# Patient Record
Sex: MALE | Race: BLACK OR AFRICAN AMERICAN | NOT HISPANIC OR LATINO | Employment: FULL TIME | URBAN - METROPOLITAN AREA
[De-identification: names, ages, dates, MRNs, and addresses within clinical notes are randomized per-mention and may not be internally consistent; named-entity substitution may affect disease eponyms.]

---

## 2023-06-27 PROBLEM — E87.6 HYPOKALEMIA: Status: ACTIVE | Noted: 2023-06-27

## 2023-06-27 PROBLEM — M79.671 RIGHT FOOT PAIN: Status: ACTIVE | Noted: 2023-06-27

## 2023-06-27 PROBLEM — N18.9 CHRONIC RENAL INSUFFICIENCY: Status: ACTIVE | Noted: 2023-06-27

## 2023-06-27 PROBLEM — I67.2 CEREBRAL ATHEROSCLEROSIS: Status: ACTIVE | Noted: 2023-06-27

## 2023-06-27 PROBLEM — E11.9 DIABETES MELLITUS TYPE 2, CONTROLLED (MULTI): Status: ACTIVE | Noted: 2023-06-27

## 2023-06-27 PROBLEM — R04.0 EPISTAXIS: Status: ACTIVE | Noted: 2023-06-27

## 2023-06-27 PROBLEM — K21.9 GE REFLUX: Status: ACTIVE | Noted: 2023-06-27

## 2023-06-27 PROBLEM — R05.3 CHRONIC COUGH: Status: ACTIVE | Noted: 2023-06-27

## 2023-06-27 PROBLEM — R20.0 LEFT FACIAL NUMBNESS: Status: ACTIVE | Noted: 2023-06-27

## 2023-06-27 PROBLEM — E66.813 CLASS 3 SEVERE OBESITY IN ADULT: Status: ACTIVE | Noted: 2023-06-27

## 2023-06-27 PROBLEM — E78.5 DYSLIPIDEMIA: Status: ACTIVE | Noted: 2023-06-27

## 2023-06-27 PROBLEM — I61.9 INTRACEREBRAL HEMORRHAGE (MULTI): Status: ACTIVE | Noted: 2023-06-27

## 2023-06-27 PROBLEM — I10 BENIGN ESSENTIAL HYPERTENSION: Status: ACTIVE | Noted: 2023-06-27

## 2023-06-27 PROBLEM — D64.9 ANEMIA: Status: ACTIVE | Noted: 2023-06-27

## 2023-06-27 PROBLEM — R40.0 DAYTIME SOMNOLENCE: Status: ACTIVE | Noted: 2023-06-27

## 2023-06-27 PROBLEM — E66.01 CLASS 3 SEVERE OBESITY IN ADULT (MULTI): Status: ACTIVE | Noted: 2023-06-27

## 2023-06-27 PROBLEM — N52.9 MALE ERECTILE DISORDER: Status: ACTIVE | Noted: 2023-06-27

## 2023-06-27 PROBLEM — G47.33 OBSTRUCTIVE SLEEP APNEA: Status: ACTIVE | Noted: 2023-06-27

## 2023-06-27 PROBLEM — I50.33 ACUTE ON CHRONIC DIASTOLIC CONGESTIVE HEART FAILURE (MULTI): Status: ACTIVE | Noted: 2023-06-27

## 2023-06-27 PROBLEM — R09.89 THROAT CLEARING: Status: ACTIVE | Noted: 2023-06-27

## 2023-06-27 RX ORDER — AMLODIPINE BESYLATE 5 MG/1
1 TABLET ORAL DAILY
COMMUNITY
Start: 2016-12-13 | End: 2024-05-30 | Stop reason: SDUPTHER

## 2023-06-27 RX ORDER — CARVEDILOL 25 MG/1
1 TABLET ORAL 2 TIMES DAILY
COMMUNITY
Start: 2016-02-20 | End: 2023-09-26 | Stop reason: SDUPTHER

## 2023-06-27 RX ORDER — NAPROXEN SODIUM 220 MG/1
81 TABLET, FILM COATED ORAL DAILY
COMMUNITY
Start: 2021-05-20 | End: 2024-05-30 | Stop reason: WASHOUT

## 2023-06-27 RX ORDER — CLOPIDOGREL BISULFATE 75 MG/1
1 TABLET ORAL DAILY
COMMUNITY
Start: 2021-05-20 | End: 2024-05-30 | Stop reason: WASHOUT

## 2023-06-27 RX ORDER — ATORVASTATIN CALCIUM 80 MG/1
1 TABLET, FILM COATED ORAL DAILY
COMMUNITY
Start: 2021-05-20 | End: 2024-05-30 | Stop reason: WASHOUT

## 2023-06-27 RX ORDER — ACETAMINOPHEN 325 MG/1
325 TABLET ORAL EVERY 6 HOURS
COMMUNITY
Start: 2020-11-19 | End: 2024-05-30 | Stop reason: WASHOUT

## 2023-06-27 RX ORDER — HYDRALAZINE HYDROCHLORIDE 100 MG/1
1 TABLET, FILM COATED ORAL 3 TIMES DAILY
COMMUNITY
Start: 2016-12-13 | End: 2024-01-31 | Stop reason: SDUPTHER

## 2023-06-29 ENCOUNTER — APPOINTMENT (OUTPATIENT)
Dept: PRIMARY CARE | Facility: CLINIC | Age: 53
End: 2023-06-29
Payer: COMMERCIAL

## 2023-06-29 ENCOUNTER — OFFICE VISIT (OUTPATIENT)
Dept: PRIMARY CARE | Facility: CLINIC | Age: 53
End: 2023-06-29
Payer: COMMERCIAL

## 2023-06-29 VITALS — BODY MASS INDEX: 46.38 KG/M2 | WEIGHT: 305 LBS

## 2023-06-29 DIAGNOSIS — Z12.11 COLON CANCER SCREENING: ICD-10-CM

## 2023-06-29 DIAGNOSIS — N18.9 CHRONIC RENAL IMPAIRMENT, UNSPECIFIED CKD STAGE: Primary | ICD-10-CM

## 2023-06-29 DIAGNOSIS — E78.5 DYSLIPIDEMIA: ICD-10-CM

## 2023-06-29 DIAGNOSIS — I10 BENIGN ESSENTIAL HYPERTENSION: ICD-10-CM

## 2023-06-29 DIAGNOSIS — E66.01 CLASS 3 SEVERE OBESITY WITH BODY MASS INDEX (BMI) OF 45.0 TO 49.9 IN ADULT, UNSPECIFIED OBESITY TYPE, UNSPECIFIED WHETHER SERIOUS COMORBIDITY PRESENT (MULTI): ICD-10-CM

## 2023-06-29 DIAGNOSIS — I67.2 CEREBRAL ATHEROSCLEROSIS: ICD-10-CM

## 2023-06-29 PROCEDURE — 99214 OFFICE O/P EST MOD 30 MIN: CPT | Performed by: INTERNAL MEDICINE

## 2023-06-29 PROCEDURE — 4010F ACE/ARB THERAPY RXD/TAKEN: CPT | Performed by: INTERNAL MEDICINE

## 2023-06-29 PROCEDURE — 3008F BODY MASS INDEX DOCD: CPT | Performed by: INTERNAL MEDICINE

## 2023-06-29 RX ORDER — LISINOPRIL 20 MG/1
20 TABLET ORAL DAILY
Qty: 30 TABLET | Refills: 5 | Status: CANCELLED | OUTPATIENT
Start: 2023-06-29 | End: 2023-12-26

## 2023-06-29 RX ORDER — HYDROCHLOROTHIAZIDE 50 MG/1
50 TABLET ORAL DAILY
COMMUNITY
End: 2024-01-31 | Stop reason: WASHOUT

## 2023-06-29 RX ORDER — POTASSIUM CHLORIDE 750 MG/1
1 CAPSULE, EXTENDED RELEASE ORAL DAILY
COMMUNITY
Start: 2021-05-20 | End: 2023-08-03

## 2023-06-29 RX ORDER — OLMESARTAN MEDOXOMIL 40 MG/1
40 TABLET ORAL DAILY
Qty: 30 TABLET | Refills: 5 | Status: SHIPPED | OUTPATIENT
Start: 2023-06-29 | End: 2023-07-20 | Stop reason: SDUPTHER

## 2023-06-29 RX ORDER — LOSARTAN POTASSIUM 50 MG/1
50 TABLET ORAL DAILY
Qty: 60 TABLET | Refills: 0 | Status: SHIPPED | OUTPATIENT
Start: 2023-06-29 | End: 2023-06-29

## 2023-06-29 ASSESSMENT — ENCOUNTER SYMPTOMS
FATIGUE: 0
HEMATURIA: 0
POLYDIPSIA: 0
APPETITE CHANGE: 0
COUGH: 0
FEVER: 0
HEADACHES: 0
WEAKNESS: 0
NUMBNESS: 0
DYSURIA: 0
STRIDOR: 0
WHEEZING: 0
CHILLS: 0
MYALGIAS: 0
CONFUSION: 0
SHORTNESS OF BREATH: 0
TREMORS: 0
ABDOMINAL DISTENTION: 0
PALPITATIONS: 0
DIFFICULTY URINATING: 0

## 2023-06-29 NOTE — LETTER
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    If you have any questions or concerns, please don't hesitate to call.         Sincerely,        Tammy Villatoro,     CC: No Recipients

## 2023-06-29 NOTE — PROGRESS NOTES
Subjective   Patient ID: Alvin VILLANUEVA Dyer is a 52 y.o. male with a PMH of HTN, obesity, suspected CKD, CVA on Plavix, who presents to [facility] clinic for follow up appointment. Today, we discussed his elevated BP in the office and one the last couple of readings. He states that one of the contributing factors could be stress related related to work. Otherwise, denies suicidal ideation or having a feeling to harm himself or others.   Denies all of ROS.     HPI  Past Medical History:   Diagnosis Date    Anemia, unspecified 10/29/2021    Anemia    Chronic kidney disease, unspecified 05/11/2022    Chronic renal insufficiency    Epistaxis     Frequent nosebleeds    Essential (primary) hypertension 08/11/2022    Benign essential hypertension    Nontraumatic intracerebral hemorrhage, unspecified (CMS/HCC) 12/02/2016    Intracerebral hemorrhage    Other forms of dyspnea 02/18/2016    Dyspnea on exertion    Personal history of other diseases of the circulatory system     History of congestive heart disease    Personal history of other endocrine, nutritional and metabolic disease 11/18/2016    History of hyperglycemia    Personal history of transient ischemic attack (TIA), and cerebral infarction without residual deficits     History of cerebrovascular accident     Past Surgical History:   Procedure Laterality Date    APPENDECTOMY  12/02/2016    Appendectomy    MR HEAD ANGIO WO IV CONTRAST  11/19/2020    MR HEAD ANGIO WO IV CONTRAST 11/19/2020 [facility]    MR HEAD ANGIO WO IV CONTRAST  2/23/2021    MR HEAD ANGIO WO IV CONTRAST 2/23/2021 [facility]    MR NECK ANGIO WO IV CONTRAST  11/19/2020    MR NECK ANGIO WO IV CONTRAST 11/19/2020 [facility]    MR NECK ANGIO WO IV CONTRAST  2/23/2021    MR NECK ANGIO WO IV CONTRAST 2/23/2021 [facility]     No family history on file.     Ace inhibitors    Review of Systems   Constitutional:  Negative for appetite change, chills, fatigue and fever.    Eyes:  Negative for visual disturbance.   Respiratory:  Negative for cough, shortness of breath, wheezing and stridor.    Cardiovascular:  Negative for chest pain, palpitations and leg swelling.   Gastrointestinal:  Negative for abdominal distention.   Endocrine: Negative for polydipsia and polyuria.   Genitourinary:  Negative for difficulty urinating, dysuria, enuresis and hematuria.   Musculoskeletal:  Negative for myalgias.   Skin:  Negative for pallor.   Neurological:  Negative for tremors, weakness, numbness and headaches.   Psychiatric/Behavioral:  Negative for confusion and suicidal ideas.    Patient ID: Alvin Dyer is a 52 y.o. male.    Procedures    Objective   Physical Exam  Constitutional:       General: He is not in acute distress.     Appearance: Normal appearance. He is obese.   HENT:      Head: Normocephalic and atraumatic.      Nose: Nose normal.      Mouth/Throat:      Mouth: Mucous membranes are moist.   Eyes:      Conjunctiva/sclera: Conjunctivae normal.   Cardiovascular:      Rate and Rhythm: Normal rate and regular rhythm.      Heart sounds: Normal heart sounds.   Pulmonary:      Breath sounds: Normal breath sounds.   Abdominal:      General: Bowel sounds are normal.      Palpations: Abdomen is soft.   Musculoskeletal:         General: No swelling.      Cervical back: Neck supple.   Skin:     General: Skin is warm and dry.   Neurological:      General: No focal deficit present.      Mental Status: He is alert.       Wt 138 kg (305 lb)   BMI 46.38 kg/m²     Assessment/Plan   Problem List Items Addressed This Visit       Benign essential hypertension    Relevant Medications    olmesartan (BENIcar) 40 mg tablet    Other Relevant Orders    Hemoglobin A1C    Albumin , Urine Random    Comprehensive Metabolic Panel    Vitamin D, Total    Lipid Panel Non-Fasting    CBC    Cerebral atherosclerosis     - on Plavix d/t h/o CVA         Chronic renal insufficiency - Primary    Relevant Orders     Referral to Nephrology    Class 3 severe obesity in adult (CMS/HCC)    Relevant Orders    TSH with reflex to Free T4 if abnormal    Lipid Panel Non-Fasting    CBC    Dyslipidemia    Relevant Orders    Hemoglobin A1C    Albumin , Urine Random    Comprehensive Metabolic Panel    Vitamin D, Total    Lipid Panel Non-Fasting    CBC     Other Visit Diagnoses       Colon cancer screening        Relevant Orders    Cologuard® colon cancer screening

## 2023-07-20 ENCOUNTER — OFFICE VISIT (OUTPATIENT)
Dept: PRIMARY CARE | Facility: CLINIC | Age: 53
End: 2023-07-20
Payer: COMMERCIAL

## 2023-07-20 VITALS — WEIGHT: 299 LBS | SYSTOLIC BLOOD PRESSURE: 210 MMHG | DIASTOLIC BLOOD PRESSURE: 125 MMHG | BODY MASS INDEX: 45.46 KG/M2

## 2023-07-20 DIAGNOSIS — I10 BENIGN ESSENTIAL HYPERTENSION: ICD-10-CM

## 2023-07-20 DIAGNOSIS — N18.9 CHRONIC RENAL IMPAIRMENT, UNSPECIFIED CKD STAGE: Primary | ICD-10-CM

## 2023-07-20 PROCEDURE — 99213 OFFICE O/P EST LOW 20 MIN: CPT | Performed by: INTERNAL MEDICINE

## 2023-07-20 PROCEDURE — 3077F SYST BP >= 140 MM HG: CPT | Performed by: INTERNAL MEDICINE

## 2023-07-20 PROCEDURE — 3080F DIAST BP >= 90 MM HG: CPT | Performed by: INTERNAL MEDICINE

## 2023-07-20 PROCEDURE — 4010F ACE/ARB THERAPY RXD/TAKEN: CPT | Performed by: INTERNAL MEDICINE

## 2023-07-20 PROCEDURE — 3008F BODY MASS INDEX DOCD: CPT | Performed by: INTERNAL MEDICINE

## 2023-07-20 RX ORDER — OLMESARTAN MEDOXOMIL 40 MG/1
40 TABLET ORAL DAILY
Qty: 90 TABLET | Refills: 1 | Status: SHIPPED | OUTPATIENT
Start: 2023-07-20 | End: 2024-05-30 | Stop reason: WASHOUT

## 2023-07-20 RX ORDER — OLMESARTAN MEDOXOMIL 40 MG/1
40 TABLET ORAL DAILY
Qty: 90 TABLET | Refills: 1 | Status: SHIPPED | OUTPATIENT
Start: 2023-07-20 | End: 2023-07-20 | Stop reason: SDUPTHER

## 2023-07-20 RX ORDER — OLMESARTAN MEDOXOMIL 40 MG/1
40 TABLET ORAL DAILY
Qty: 30 TABLET | Refills: 0 | Status: SHIPPED | OUTPATIENT
Start: 2023-07-20 | End: 2023-07-20 | Stop reason: SDUPTHER

## 2023-07-20 ASSESSMENT — ENCOUNTER SYMPTOMS
WHEEZING: 0
PALPITATIONS: 0
SHORTNESS OF BREATH: 0
ACTIVITY CHANGE: 0
DIZZINESS: 0
NAUSEA: 0
FATIGUE: 0
ABDOMINAL PAIN: 0
ABDOMINAL DISTENTION: 0
WEAKNESS: 0
VOMITING: 0
FEVER: 0
CHEST TIGHTNESS: 0
CHILLS: 0
NUMBNESS: 0

## 2023-07-20 NOTE — ASSESSMENT & PLAN NOTE
- BP today in office 210/125  - Pt asymptomatic, has not been able to start Olmesartan   - Start Olmesartan 40mg once daily   - Advised patient to increase water intake   - Follow up in 2 weeks

## 2023-07-20 NOTE — PROGRESS NOTES
Subjective   Alvin Dyer is a 52 y.o. male who presents for Follow-up (HTN).  Mr. Dyer is a 52 year old male with PMHx significant for HTN, obesity, suspected CKD, CVA on Plavix, who presents for 2 week follow up. Patient was unable to start taking Olmesartan as he states the pharmacy did not have it. BP today in office 210/125 but patient is asymptomatic. Has not yet called to schedule an appointment with Nephrology. Denies chest pain, SOB, dizziness, headache, or vision changes.         Review of Systems   Constitutional:  Negative for activity change, chills, fatigue and fever.   Respiratory:  Negative for chest tightness, shortness of breath and wheezing.    Cardiovascular:  Negative for chest pain, palpitations and leg swelling.   Gastrointestinal:  Negative for abdominal distention, abdominal pain, nausea and vomiting.   Neurological:  Negative for dizziness, weakness and numbness.       Objective   Physical Exam  Constitutional:       General: He is not in acute distress.     Appearance: Normal appearance.   Cardiovascular:      Rate and Rhythm: Normal rate and regular rhythm.      Heart sounds: Normal heart sounds. No murmur heard.     No friction rub. No gallop.   Pulmonary:      Effort: Pulmonary effort is normal. No respiratory distress.      Breath sounds: Normal breath sounds. No wheezing, rhonchi or rales.   Abdominal:      General: Abdomen is flat. Bowel sounds are normal. There is no distension.      Palpations: Abdomen is soft.      Tenderness: There is no abdominal tenderness.   Musculoskeletal:         General: No swelling or tenderness. Normal range of motion.   Skin:     General: Skin is warm and dry.      Findings: No erythema or rash.   Neurological:      General: No focal deficit present.      Mental Status: He is alert and oriented to person, place, and time. Mental status is at baseline.   Psychiatric:         Mood and Affect: Mood normal.         Behavior: Behavior normal.          Assessment/Plan   Problem List Items Addressed This Visit          Cardiac and Vasculature    Benign essential hypertension     - BP today in office 210/125  - Pt asymptomatic, has not been able to start Olmesartan   - Start Olmesartan 40mg once daily   - Advised patient to increase water intake   - Follow up in 2 weeks          Relevant Medications    olmesartan (BENIcar) 40 mg tablet    Other Relevant Orders    Referral to Nephrology       Genitourinary and Reproductive    Chronic renal insufficiency - Primary     - Nephrology referral placed again, pt did not call previously          Relevant Orders    Referral to Nephrology     # Health Maintenance   - Yearly labs UTD     Follow up in 2 weeks

## 2023-07-29 LAB — NONINV COLON CA DNA+OCC BLD SCRN STL QL: NEGATIVE

## 2023-08-03 ENCOUNTER — OFFICE VISIT (OUTPATIENT)
Dept: PRIMARY CARE | Facility: CLINIC | Age: 53
End: 2023-08-03
Payer: COMMERCIAL

## 2023-08-03 VITALS — DIASTOLIC BLOOD PRESSURE: 90 MMHG | BODY MASS INDEX: 45.77 KG/M2 | SYSTOLIC BLOOD PRESSURE: 160 MMHG | WEIGHT: 301 LBS

## 2023-08-03 DIAGNOSIS — Z86.73 HISTORY OF STROKE: ICD-10-CM

## 2023-08-03 DIAGNOSIS — N18.32 STAGE 3B CHRONIC KIDNEY DISEASE (MULTI): ICD-10-CM

## 2023-08-03 DIAGNOSIS — G47.33 OSA (OBSTRUCTIVE SLEEP APNEA): ICD-10-CM

## 2023-08-03 DIAGNOSIS — I10 UNCONTROLLED HYPERTENSION: ICD-10-CM

## 2023-08-03 DIAGNOSIS — I10 BENIGN ESSENTIAL HYPERTENSION: Primary | ICD-10-CM

## 2023-08-03 PROCEDURE — 3008F BODY MASS INDEX DOCD: CPT | Performed by: INTERNAL MEDICINE

## 2023-08-03 PROCEDURE — 3080F DIAST BP >= 90 MM HG: CPT | Performed by: INTERNAL MEDICINE

## 2023-08-03 PROCEDURE — 3077F SYST BP >= 140 MM HG: CPT | Performed by: INTERNAL MEDICINE

## 2023-08-03 PROCEDURE — 4010F ACE/ARB THERAPY RXD/TAKEN: CPT | Performed by: INTERNAL MEDICINE

## 2023-08-03 PROCEDURE — 99214 OFFICE O/P EST MOD 30 MIN: CPT | Performed by: INTERNAL MEDICINE

## 2023-08-03 RX ORDER — SPIRONOLACTONE 25 MG/1
25 TABLET ORAL DAILY
Qty: 90 TABLET | Refills: 1 | Status: SHIPPED | OUTPATIENT
Start: 2023-08-03 | End: 2024-05-30 | Stop reason: WASHOUT

## 2023-08-03 ASSESSMENT — ENCOUNTER SYMPTOMS
SHORTNESS OF BREATH: 0
CHEST TIGHTNESS: 0
ABDOMINAL PAIN: 0

## 2023-08-03 NOTE — ASSESSMENT & PLAN NOTE
BP continues to be uncontrolled after 2 weeks of starting Olmesartan 40mg daily   -start spironolactone 25mg daily today   -stop potassium supplements   -instructed pt to get his labs in 1 to 2 weeks to follow potassium lvl

## 2023-08-03 NOTE — ASSESSMENT & PLAN NOTE
Pt unsure if he takes Plavix at home. He does take aspirin. Printed medication list for pt this visit and he will double check with his home medications. Review with pt next visit.

## 2023-08-03 NOTE — ASSESSMENT & PLAN NOTE
Made another referral for nephrology as pt could not make one since last visit. Would like nephrology to help with workup of secondary cause of HTN such as renally related.   -instructed pt to call scheduling if he does not get a call.

## 2023-08-03 NOTE — ASSESSMENT & PLAN NOTE
Last sleep study 2021 recommended BIPAP. Pt states he has not been able to continue it at home and is not using anything at night.   He reports sleeping 4 hours a day and takes naps as needed.   -new sleep medicine referral

## 2023-08-03 NOTE — PROGRESS NOTES
Subjective   Patient ID: Alvin Hernandezman is a 52 y.o. male who presents for Follow-up (2 week follow up HTN).    52m with HFrEF 45-50% EF, HTN, HLD, hx CVA, CKD3, SUZIE not on BiPAP who presents for a follow up appt for uncontrolled HTN.   Pt has had high BP for years, off and on. He says he recently started Olmesartan about 2 weeks ago. His home BP the last few days has been 140s to 170s systolic, 80s to 90s diastolic, higher midday compared to mornings. 174/107 this visit, 160/90 this visit on recheck. He reports compliance with medications. Pt denies CP, SNOW, dyspnea, abd pain, HA but has had vision change, harder to see smaller things for about 1 year.   Nephrology: notes he tried to set up appt but was unable per pt.   Pt has had sleep testing in 2021 and had BiPAP but now does not use it due to  sleeps 4 hours at night, might go back to sleep for a couple hours during the day   He states he is not taking Plavix right now.            Review of Systems   Eyes:  Positive for visual disturbance.   Respiratory:  Negative for chest tightness and shortness of breath.    Cardiovascular:  Negative for chest pain.   Gastrointestinal:  Negative for abdominal pain.   All other systems reviewed and are negative.      Objective   /90 (BP Location: Left arm, Patient Position: Sitting)   Wt 137 kg (301 lb)   BMI 45.77 kg/m²     Physical Exam  Vitals reviewed.   Constitutional:       General: He is not in acute distress.  HENT:      Head: Normocephalic and atraumatic.   Eyes:      Extraocular Movements: Extraocular movements intact.      Conjunctiva/sclera: Conjunctivae normal.   Cardiovascular:      Rate and Rhythm: Normal rate and regular rhythm.      Heart sounds: No murmur heard.     No friction rub. No gallop.      Comments: No bruits to renal auscultation areas   Pulmonary:      Effort: Pulmonary effort is normal.      Breath sounds: Normal breath sounds. No wheezing, rhonchi or rales.   Abdominal:      General:  Bowel sounds are normal.      Palpations: Abdomen is soft. There is no mass.      Tenderness: There is no abdominal tenderness. There is no guarding or rebound.      Comments: Protuberant abdomen    Musculoskeletal:         General: No tenderness.      Cervical back: Neck supple.      Right lower leg: No edema.      Left lower leg: No edema.   Skin:     General: Skin is warm and dry.      Findings: No bruising or rash.   Neurological:      Mental Status: He is alert. Mental status is at baseline.      Comments: Answering questions, following commands. Moving all extremities.    Psychiatric:         Mood and Affect: Mood and affect normal.         Assessment/Plan   Problem List Items Addressed This Visit       Uncontrolled hypertension - Primary     BP continues to be uncontrolled after 2 weeks of starting Olmesartan 40mg daily   -start spironolactone 25mg daily today   -stop potassium supplements   -instructed pt to get his labs in 1 to 2 weeks to follow potassium lvl            Relevant Medications    spironolactone (Aldactone) 25 mg tablet    Other Relevant Orders    Referral to Nephrology    SUZIE (obstructive sleep apnea)     Last sleep study 2021 recommended BIPAP. Pt states he has not been able to continue it at home and is not using anything at night.   He reports sleeping 4 hours a day and takes naps as needed.   -new sleep medicine referral            Relevant Orders    Referral to Adult Sleep Medicine    Stage 3b chronic kidney disease (CMS/HCC)     Made another referral for nephrology as pt could not make one since last visit. Would like nephrology to help with workup of secondary cause of HTN such as renally related.   -instructed pt to call scheduling if he does not get a call.            Relevant Orders    Referral to Nephrology    History of stroke     Pt unsure if he takes Plavix at home. He does take aspirin. Printed medication list for pt this visit and he will double check with his home medications.  Review with pt next visit.           #health Maintenance  [facility]Scope - 7/2023 Cologuard negative, recommended repeat Cologuard 7/2026.    Labs: none this visit

## 2023-08-15 ENCOUNTER — LAB (OUTPATIENT)
Dept: LAB | Facility: LAB | Age: 53
End: 2023-08-15
Payer: COMMERCIAL

## 2023-08-15 DIAGNOSIS — E66.01 CLASS 3 SEVERE OBESITY WITH BODY MASS INDEX (BMI) OF 45.0 TO 49.9 IN ADULT, UNSPECIFIED OBESITY TYPE, UNSPECIFIED WHETHER SERIOUS COMORBIDITY PRESENT (MULTI): ICD-10-CM

## 2023-08-15 DIAGNOSIS — E78.5 DYSLIPIDEMIA: ICD-10-CM

## 2023-08-15 DIAGNOSIS — I10 BENIGN ESSENTIAL HYPERTENSION: ICD-10-CM

## 2023-08-15 LAB
ALANINE AMINOTRANSFERASE (SGPT) (U/L) IN SER/PLAS: 16 U/L (ref 10–52)
ALBUMIN (G/DL) IN SER/PLAS: 4 G/DL (ref 3.4–5)
ALBUMIN (MG/L) IN URINE: 180.4 MG/L
ALBUMIN/CREATININE (UG/MG) IN URINE: 26.5 UG/MG CRT (ref 0–30)
ALKALINE PHOSPHATASE (U/L) IN SER/PLAS: 54 U/L (ref 33–120)
ANION GAP IN SER/PLAS: 14 MMOL/L (ref 10–20)
ASPARTATE AMINOTRANSFERASE (SGOT) (U/L) IN SER/PLAS: 16 U/L (ref 9–39)
BILIRUBIN TOTAL (MG/DL) IN SER/PLAS: 0.5 MG/DL (ref 0–1.2)
CALCIDIOL (25 OH VITAMIN D3) (NG/ML) IN SER/PLAS: 17 NG/ML
CALCIUM (MG/DL) IN SER/PLAS: 9.1 MG/DL (ref 8.6–10.6)
CARBON DIOXIDE, TOTAL (MMOL/L) IN SER/PLAS: 26 MMOL/L (ref 21–32)
CHLORIDE (MMOL/L) IN SER/PLAS: 105 MMOL/L (ref 98–107)
CHOLESTEROL (MG/DL) IN SER/PLAS: 132 MG/DL (ref 0–199)
CHOLESTEROL IN HDL (MG/DL) IN SER/PLAS: 32.3 MG/DL
CHOLESTEROL/HDL RATIO: 4.1
CREATININE (MG/DL) IN SER/PLAS: 1.99 MG/DL (ref 0.5–1.3)
CREATININE (MG/DL) IN URINE: 681 MG/DL (ref 20–370)
GFR MALE: 39 ML/MIN/1.73M2
GLUCOSE (MG/DL) IN SER/PLAS: 160 MG/DL (ref 74–99)
NON-HDL CHOLESTEROL: 100 MG/DL
POTASSIUM (MMOL/L) IN SER/PLAS: 3.4 MMOL/L (ref 3.5–5.3)
PROTEIN TOTAL: 7.5 G/DL (ref 6.4–8.2)
SODIUM (MMOL/L) IN SER/PLAS: 142 MMOL/L (ref 136–145)
THYROTROPIN (MIU/L) IN SER/PLAS BY DETECTION LIMIT <= 0.05 MIU/L: 1.8 MIU/L (ref 0.44–3.98)
UREA NITROGEN (MG/DL) IN SER/PLAS: 18 MG/DL (ref 6–23)

## 2023-08-15 PROCEDURE — 82043 UR ALBUMIN QUANTITATIVE: CPT

## 2023-08-15 PROCEDURE — 85027 COMPLETE CBC AUTOMATED: CPT

## 2023-08-15 PROCEDURE — 83718 ASSAY OF LIPOPROTEIN: CPT

## 2023-08-15 PROCEDURE — 82306 VITAMIN D 25 HYDROXY: CPT

## 2023-08-15 PROCEDURE — 83036 HEMOGLOBIN GLYCOSYLATED A1C: CPT

## 2023-08-15 PROCEDURE — 82570 ASSAY OF URINE CREATININE: CPT

## 2023-08-15 PROCEDURE — 84443 ASSAY THYROID STIM HORMONE: CPT

## 2023-08-15 PROCEDURE — 80053 COMPREHEN METABOLIC PANEL: CPT

## 2023-08-15 PROCEDURE — 36415 COLL VENOUS BLD VENIPUNCTURE: CPT

## 2023-08-15 PROCEDURE — 82465 ASSAY BLD/SERUM CHOLESTEROL: CPT

## 2023-08-16 LAB
ERYTHROCYTE DISTRIBUTION WIDTH (RATIO) BY AUTOMATED COUNT: 13.5 % (ref 11.5–14.5)
ERYTHROCYTE MEAN CORPUSCULAR HEMOGLOBIN CONCENTRATION (G/DL) BY AUTOMATED: 31.8 G/DL (ref 32–36)
ERYTHROCYTE MEAN CORPUSCULAR VOLUME (FL) BY AUTOMATED COUNT: 90 FL (ref 80–100)
ERYTHROCYTES (10*6/UL) IN BLOOD BY AUTOMATED COUNT: 5.01 X10E12/L (ref 4.5–5.9)
ESTIMATED AVERAGE GLUCOSE FOR HBA1C: 140 MG/DL
HEMATOCRIT (%) IN BLOOD BY AUTOMATED COUNT: 45 % (ref 41–52)
HEMOGLOBIN (G/DL) IN BLOOD: 14.3 G/DL (ref 13.5–17.5)
HEMOGLOBIN A1C/HEMOGLOBIN TOTAL IN BLOOD: 6.5 %
LEUKOCYTES (10*3/UL) IN BLOOD BY AUTOMATED COUNT: 7.6 X10E9/L (ref 4.4–11.3)
NRBC (PER 100 WBCS) BY AUTOMATED COUNT: 0 /100 WBC (ref 0–0)
PLATELETS (10*3/UL) IN BLOOD AUTOMATED COUNT: 197 X10E9/L (ref 150–450)

## 2023-08-22 DIAGNOSIS — E55.9 VITAMIN D DEFICIENCY: Primary | ICD-10-CM

## 2023-08-22 RX ORDER — CHOLECALCIFEROL (VITAMIN D3) 1250 MCG
50000 TABLET ORAL
Qty: 8 TABLET | Refills: 0 | Status: SHIPPED | OUTPATIENT
Start: 2023-08-22 | End: 2024-01-31 | Stop reason: WASHOUT

## 2023-09-26 DIAGNOSIS — I10 UNCONTROLLED HYPERTENSION: Primary | ICD-10-CM

## 2023-09-26 RX ORDER — CARVEDILOL 25 MG/1
25 TABLET ORAL 2 TIMES DAILY
Qty: 180 TABLET | Refills: 1 | Status: SHIPPED | OUTPATIENT
Start: 2023-09-26 | End: 2024-05-30 | Stop reason: SDUPTHER

## 2023-09-28 LAB
ANION GAP IN SER/PLAS: 13 MMOL/L (ref 10–20)
CALCIDIOL (25 OH VITAMIN D3) (NG/ML) IN SER/PLAS: 46 NG/ML
CALCIUM (MG/DL) IN SER/PLAS: 9.3 MG/DL (ref 8.6–10.6)
CARBON DIOXIDE, TOTAL (MMOL/L) IN SER/PLAS: 30 MMOL/L (ref 21–32)
CHLORIDE (MMOL/L) IN SER/PLAS: 103 MMOL/L (ref 98–107)
CREATININE (MG/DL) IN SER/PLAS: 1.96 MG/DL (ref 0.5–1.3)
CREATININE (MG/DL) IN URINE: 160 MG/DL (ref 20–370)
GFR MALE: 40 ML/MIN/1.73M2
GLUCOSE (MG/DL) IN SER/PLAS: 202 MG/DL (ref 74–99)
PARATHYRIN INTACT (PG/ML) IN SER/PLAS: 124.6 PG/ML (ref 18.5–88)
PHOSPHATE (MG/DL) IN SER/PLAS: 2.3 MG/DL (ref 2.5–4.9)
POTASSIUM (MMOL/L) IN SER/PLAS: 3.7 MMOL/L (ref 3.5–5.3)
PROTEIN (MG/DL) IN URINE: 16 MG/DL (ref 5–25)
PROTEIN/CREATININE (MG/MG) IN URINE: 0.1 MG/MG CREAT (ref 0–0.17)
SODIUM (MMOL/L) IN SER/PLAS: 142 MMOL/L (ref 136–145)
URATE (MG/DL) IN SER/PLAS: 9.4 MG/DL (ref 4–7.5)
UREA NITROGEN (MG/DL) IN SER/PLAS: 24 MG/DL (ref 6–23)

## 2023-09-29 LAB
APPEARANCE, URINE: CLEAR
BILIRUBIN, URINE: NEGATIVE
BLOOD, URINE: NEGATIVE
COLOR, URINE: YELLOW
GLUCOSE, URINE: ABNORMAL MG/DL
KETONES, URINE: NEGATIVE MG/DL
LEUKOCYTE ESTERASE, URINE: NEGATIVE
NITRITE, URINE: NEGATIVE
PH, URINE: 5 (ref 5–8)
PROTEIN, URINE: NEGATIVE MG/DL
SPECIFIC GRAVITY, URINE: 1.02 (ref 1–1.03)
UROBILINOGEN, URINE: <2 MG/DL (ref 0–1.9)

## 2024-01-31 ENCOUNTER — OFFICE VISIT (OUTPATIENT)
Dept: NEPHROLOGY | Facility: CLINIC | Age: 54
End: 2024-01-31
Payer: COMMERCIAL

## 2024-01-31 VITALS
HEART RATE: 109 BPM | BODY MASS INDEX: 46.98 KG/M2 | HEIGHT: 68 IN | DIASTOLIC BLOOD PRESSURE: 113 MMHG | SYSTOLIC BLOOD PRESSURE: 188 MMHG | WEIGHT: 310 LBS

## 2024-01-31 DIAGNOSIS — I10 ESSENTIAL HYPERTENSION: ICD-10-CM

## 2024-01-31 DIAGNOSIS — N18.32 HYPERTENSIVE KIDNEY DISEASE WITH STAGE 3B CHRONIC KIDNEY DISEASE (MULTI): ICD-10-CM

## 2024-01-31 DIAGNOSIS — I12.9 HYPERTENSIVE KIDNEY DISEASE WITH STAGE 3B CHRONIC KIDNEY DISEASE (MULTI): ICD-10-CM

## 2024-01-31 DIAGNOSIS — N18.32 STAGE 3B CHRONIC KIDNEY DISEASE (MULTI): Primary | ICD-10-CM

## 2024-01-31 PROCEDURE — 3077F SYST BP >= 140 MM HG: CPT | Performed by: INTERNAL MEDICINE

## 2024-01-31 PROCEDURE — 4010F ACE/ARB THERAPY RXD/TAKEN: CPT | Performed by: INTERNAL MEDICINE

## 2024-01-31 PROCEDURE — 99214 OFFICE O/P EST MOD 30 MIN: CPT | Performed by: INTERNAL MEDICINE

## 2024-01-31 PROCEDURE — 3008F BODY MASS INDEX DOCD: CPT | Performed by: INTERNAL MEDICINE

## 2024-01-31 PROCEDURE — 3066F NEPHROPATHY DOC TX: CPT | Performed by: INTERNAL MEDICINE

## 2024-01-31 PROCEDURE — 3080F DIAST BP >= 90 MM HG: CPT | Performed by: INTERNAL MEDICINE

## 2024-01-31 RX ORDER — CHLORTHALIDONE 25 MG/1
1 TABLET ORAL DAILY
COMMUNITY
Start: 2023-09-01 | End: 2024-05-30 | Stop reason: WASHOUT

## 2024-01-31 RX ORDER — CALCITRIOL 0.25 UG/1
0.25 CAPSULE ORAL EVERY OTHER DAY
COMMUNITY
Start: 2023-09-29 | End: 2024-05-30 | Stop reason: WASHOUT

## 2024-01-31 RX ORDER — ALLOPURINOL 100 MG/1
100 TABLET ORAL DAILY
COMMUNITY
Start: 2024-01-03

## 2024-01-31 RX ORDER — HYDRALAZINE HYDROCHLORIDE 100 MG/1
150 TABLET, FILM COATED ORAL 3 TIMES DAILY
Qty: 405 TABLET | Refills: 3 | Status: SHIPPED | OUTPATIENT
Start: 2024-01-31 | End: 2024-05-30 | Stop reason: WASHOUT

## 2024-01-31 NOTE — PROGRESS NOTES
Alvin Dyer   53 y.o.    @@  [facility]/Room: [id number]/Room/bed info not found    Subjective:   The patient is being seen for a routine clinic follow-up of chronic kidney disease. Recently, the disease has been stable. Disease complications:  No hyperkalemia, no hypocalcemia, no hyperphosphatemia, no metabolic acidosis, no coagulopathy, no uremic encephalopathy, no neuropathy and no renal osteodystrophy. The patient is currently asymptomatic. No associated symptoms are reported.       Meds:   Current Outpatient Medications   Medication Sig Dispense Refill    acetaminophen (Tylenol) 325 mg tablet Take 1 tablet (325 mg) by mouth every 6 hours.      allopurinol (Zyloprim) 100 mg tablet Take 1 tablet (100 mg) by mouth once daily.      amLODIPine (Norvasc) 10 mg tablet Take 1 tablet (10 mg) by mouth once daily.      aspirin 81 mg chewable tablet Chew 1 tablet (81 mg) once daily.      atorvastatin (Lipitor) 80 mg tablet Take 1 tablet (80 mg) by mouth once daily.      calcitriol (Rocaltrol) 0.25 mcg capsule Take 1 capsule (0.25 mcg) by mouth every other day.      carvedilol (Coreg) 25 mg tablet Take 1 tablet (25 mg) by mouth 2 times a day. 180 tablet 1    chlorthalidone (Hygroton) 25 mg tablet Take 1 tablet (25 mg) by mouth once daily.      clopidogrel (Plavix) 75 mg tablet Take 1 tablet (75 mg) by mouth once daily.      hydrALAZINE (Apresoline) 100 mg tablet Take 1 tablet (100 mg) by mouth 3 times a day.      olmesartan (BENIcar) 40 mg tablet Take 1 tablet (40 mg) by mouth once daily. 90 tablet 1    spironolactone (Aldactone) 25 mg tablet Take 1 tablet (25 mg) by mouth once daily. 90 tablet 1     No current facility-administered medications for this visit.          ROS:  The patient is awake and oriented. No dizziness or lightheadedness. No chills and no fever. No headaches. No nausea and no vomiting. No shortness of breath. No cough. No sputum. No chest pain. No chest tightness. No abdominal pain. No diarrhea and no  constipation. No hematemesis or hemoptysis. No hematuria. No rectal bleeding. No melena. No epistaxis. No urinary symptoms. No flank pain. No leg edema. No leg pain. No weakness. No itching. Overall, the rest of the review of systems is also negative.  12 point review of systems otherwise negative as stated in HPI.        Physical Examination:        Vitals:    01/31/24 1025   BP: (!) 188/113   Pulse: 109     General: The patient is awake, oriented, and is not in any distress.  Head and Neck: Normocephalic. No periorbital edema.  Eyes: non-icteric  Respiratory: Symmetric air entry. Symmetric chest expansion.No respiratory distress.  Cardiovascular: Symmetric peripheral pulses.  Skin: No maculopapular rash.  Abdomen: soft, nt/nd  Musculoskeletal: No peripheral edema in both left and right upper extremities.  No edema in either left or right lower extremities.  Neuro Exam: Speech is fluent. Moves extremities.    Imaging:  === 09/28/23 ===    US RENAL COMPLETE    - Impression -  Limited exam.    No hydronephrosis bilaterally.    No focal urinary bladder abnormality identified.  No significant  postvoid residual.    MACRO:  None.       Blood Labs:  No results found for this or any previous visit (from the past 24 hour(s)).   Lab Results   Component Value Date    .6 (H) 09/28/2023    PROTUR NEGATIVE 09/28/2023    PROTUR 16 09/28/2023    PHOS 2.3 (L) 09/28/2023      Lab Results   Component Value Date    GLUCOSE 202 (H) 09/28/2023    CALCIUM 9.3 09/28/2023     09/28/2023    K 3.7 09/28/2023    CO2 30 09/28/2023     09/28/2023    BUN 24 (H) 09/28/2023    CREATININE 1.96 (H) 09/28/2023         Assessment and Plan:  1. Chronic kidney disease stage III. Last creatinine level from September 2023 is 1.96.  I asked for basic metabolic panel to be done today.     2. Hypertension. Blood pressure is high. He is on multiple antihypertensive medications.  It seems he is skips his medications from time to time.   Overall I am not sure if he has a good compliance with his medications.  I increased his hydralazine dose.  We talked about low-salt diet. He also has a sleep apnea but is not using CPAP machine.     3.  Secondary hyperparathyroidism.  He is on calcitriol.  PTH level will be checked today.     I will see him in about 4 months for follow-up.           Gerard Valerio MD

## 2024-02-15 ENCOUNTER — LAB (OUTPATIENT)
Dept: LAB | Facility: LAB | Age: 54
End: 2024-02-15
Payer: COMMERCIAL

## 2024-02-15 DIAGNOSIS — I10 ESSENTIAL HYPERTENSION: ICD-10-CM

## 2024-02-15 DIAGNOSIS — I12.9 HYPERTENSIVE KIDNEY DISEASE WITH STAGE 3B CHRONIC KIDNEY DISEASE (MULTI): ICD-10-CM

## 2024-02-15 DIAGNOSIS — N18.32 STAGE 3B CHRONIC KIDNEY DISEASE (MULTI): ICD-10-CM

## 2024-02-15 DIAGNOSIS — N18.32 HYPERTENSIVE KIDNEY DISEASE WITH STAGE 3B CHRONIC KIDNEY DISEASE (MULTI): ICD-10-CM

## 2024-02-15 LAB
ANION GAP SERPL CALC-SCNC: 14 MMOL/L (ref 10–20)
BUN SERPL-MCNC: 23 MG/DL (ref 6–23)
CALCIUM SERPL-MCNC: 9.5 MG/DL (ref 8.6–10.6)
CHLORIDE SERPL-SCNC: 101 MMOL/L (ref 98–107)
CO2 SERPL-SCNC: 27 MMOL/L (ref 21–32)
CREAT SERPL-MCNC: 1.87 MG/DL (ref 0.5–1.3)
EGFRCR SERPLBLD CKD-EPI 2021: 42 ML/MIN/1.73M*2
GLUCOSE SERPL-MCNC: 175 MG/DL (ref 74–99)
POTASSIUM SERPL-SCNC: 3.3 MMOL/L (ref 3.5–5.3)
PTH-INTACT SERPL-MCNC: 158.2 PG/ML (ref 18.5–88)
SODIUM SERPL-SCNC: 139 MMOL/L (ref 136–145)

## 2024-02-15 PROCEDURE — 80048 BASIC METABOLIC PNL TOTAL CA: CPT

## 2024-02-15 PROCEDURE — 36415 COLL VENOUS BLD VENIPUNCTURE: CPT

## 2024-02-15 PROCEDURE — 83970 ASSAY OF PARATHORMONE: CPT

## 2024-05-30 ENCOUNTER — OFFICE VISIT (OUTPATIENT)
Dept: PRIMARY CARE | Facility: CLINIC | Age: 54
End: 2024-05-30
Payer: COMMERCIAL

## 2024-05-30 ENCOUNTER — OFFICE VISIT (OUTPATIENT)
Dept: NEPHROLOGY | Facility: CLINIC | Age: 54
End: 2024-05-30
Payer: COMMERCIAL

## 2024-05-30 VITALS — DIASTOLIC BLOOD PRESSURE: 88 MMHG | SYSTOLIC BLOOD PRESSURE: 106 MMHG | OXYGEN SATURATION: 90 %

## 2024-05-30 VITALS
HEIGHT: 68 IN | DIASTOLIC BLOOD PRESSURE: 74 MMHG | HEART RATE: 92 BPM | WEIGHT: 278 LBS | BODY MASS INDEX: 42.13 KG/M2 | SYSTOLIC BLOOD PRESSURE: 121 MMHG

## 2024-05-30 DIAGNOSIS — E11.9 CONTROLLED TYPE 2 DIABETES MELLITUS WITHOUT COMPLICATION, WITH LONG-TERM CURRENT USE OF INSULIN (MULTI): ICD-10-CM

## 2024-05-30 DIAGNOSIS — I61.9 NONTRAUMATIC INTRACEREBRAL HEMORRHAGE, UNSPECIFIED CEREBRAL LOCATION, UNSPECIFIED LATERALITY (MULTI): ICD-10-CM

## 2024-05-30 DIAGNOSIS — Z86.73 HISTORY OF STROKE: ICD-10-CM

## 2024-05-30 DIAGNOSIS — I50.33 ACUTE ON CHRONIC DIASTOLIC CONGESTIVE HEART FAILURE (MULTI): Primary | ICD-10-CM

## 2024-05-30 DIAGNOSIS — D64.9 ANEMIA, UNSPECIFIED TYPE: ICD-10-CM

## 2024-05-30 DIAGNOSIS — Z79.4 CONTROLLED TYPE 2 DIABETES MELLITUS WITHOUT COMPLICATION, WITH LONG-TERM CURRENT USE OF INSULIN (MULTI): ICD-10-CM

## 2024-05-30 DIAGNOSIS — I12.9 HYPERTENSIVE KIDNEY DISEASE WITH STAGE 3B CHRONIC KIDNEY DISEASE (MULTI): ICD-10-CM

## 2024-05-30 DIAGNOSIS — N18.32 STAGE 3B CHRONIC KIDNEY DISEASE (MULTI): Primary | ICD-10-CM

## 2024-05-30 DIAGNOSIS — J42 CHRONIC BRONCHITIS, UNSPECIFIED CHRONIC BRONCHITIS TYPE (MULTI): ICD-10-CM

## 2024-05-30 DIAGNOSIS — N18.32 HYPERTENSIVE KIDNEY DISEASE WITH STAGE 3B CHRONIC KIDNEY DISEASE (MULTI): ICD-10-CM

## 2024-05-30 DIAGNOSIS — R06.2 WHEEZING: ICD-10-CM

## 2024-05-30 DIAGNOSIS — T17.908S CHRONIC PULMONARY ASPIRATION, SEQUELA: Primary | ICD-10-CM

## 2024-05-30 DIAGNOSIS — N18.32 STAGE 3B CHRONIC KIDNEY DISEASE (MULTI): ICD-10-CM

## 2024-05-30 DIAGNOSIS — I10 UNCONTROLLED HYPERTENSION: ICD-10-CM

## 2024-05-30 DIAGNOSIS — J40 BRONCHITIS: ICD-10-CM

## 2024-05-30 DIAGNOSIS — I10 ESSENTIAL HYPERTENSION: ICD-10-CM

## 2024-05-30 PROCEDURE — 3078F DIAST BP <80 MM HG: CPT | Performed by: INTERNAL MEDICINE

## 2024-05-30 PROCEDURE — 3008F BODY MASS INDEX DOCD: CPT | Performed by: INTERNAL MEDICINE

## 2024-05-30 PROCEDURE — 3074F SYST BP LT 130 MM HG: CPT | Performed by: INTERNAL MEDICINE

## 2024-05-30 PROCEDURE — 99214 OFFICE O/P EST MOD 30 MIN: CPT | Performed by: INTERNAL MEDICINE

## 2024-05-30 PROCEDURE — 1036F TOBACCO NON-USER: CPT | Performed by: INTERNAL MEDICINE

## 2024-05-30 PROCEDURE — 3079F DIAST BP 80-89 MM HG: CPT | Performed by: INTERNAL MEDICINE

## 2024-05-30 RX ORDER — CLONIDINE HYDROCHLORIDE 0.1 MG/1
0.1 TABLET ORAL
COMMUNITY
Start: 2024-05-16 | End: 2024-05-30 | Stop reason: SDUPTHER

## 2024-05-30 RX ORDER — ALBUTEROL SULFATE 0.83 MG/ML
2.5 SOLUTION RESPIRATORY (INHALATION) 4 TIMES DAILY PRN
Qty: 50 ML | Refills: 0 | Status: SHIPPED | OUTPATIENT
Start: 2024-05-30 | End: 2025-05-30

## 2024-05-30 RX ORDER — AMLODIPINE BESYLATE 5 MG/1
5 TABLET ORAL DAILY
Qty: 90 TABLET | Refills: 1 | Status: SHIPPED | OUTPATIENT
Start: 2024-05-30

## 2024-05-30 RX ORDER — CARVEDILOL 25 MG/1
25 TABLET ORAL 2 TIMES DAILY
Qty: 180 TABLET | Refills: 1 | Status: SHIPPED | OUTPATIENT
Start: 2024-05-30

## 2024-05-30 RX ORDER — AMOXICILLIN 250 MG
2 CAPSULE ORAL 2 TIMES DAILY
COMMUNITY
Start: 2024-04-25

## 2024-05-30 RX ORDER — CLONIDINE HYDROCHLORIDE 0.1 MG/1
0.1 TABLET ORAL EVERY 8 HOURS
Qty: 270 TABLET | Refills: 1 | Status: SHIPPED | OUTPATIENT
Start: 2024-05-30

## 2024-05-30 RX ORDER — ALBUTEROL SULFATE 90 UG/1
AEROSOL, METERED RESPIRATORY (INHALATION)
COMMUNITY
Start: 2024-05-16

## 2024-05-30 RX ORDER — CALCITRIOL 0.25 UG/1
0.25 CAPSULE ORAL EVERY OTHER DAY
Qty: 45 CAPSULE | Refills: 3 | Status: SHIPPED | OUTPATIENT
Start: 2024-05-30 | End: 2025-05-30

## 2024-05-30 RX ORDER — INSULIN GLARGINE 100 [IU]/ML
INJECTION, SOLUTION SUBCUTANEOUS
COMMUNITY
Start: 2024-05-16

## 2024-05-30 NOTE — PROGRESS NOTES
Primary care office Note      Name: Alvin Dyer, Age: 53 y.o., Gender: male, MRN: [id number]   Pharmacy:   [id number] - [place], [place] - [address]  [address] [address]  Phone: [phone] Fax: [phone]    [id number] - [place], [place] - [address]  [address]  [facility] [address]  Phone: [phone] Fax: [phone]    [id number] - [place], [place] - [address]  [address]  [facility] [address]  Phone: [phone] Fax: [phone]     PCP: Tammy Villatoro        Subjective:   Chief Complaint   Patient presents with    Follow-up     Hospital, Stroke complains of feeling cold, questions about medication, recent dx of DM      [facility]  - nebulizer/albuterol liquid      Alvin Dyer is a 53 y.o. male who presented to the clinic today for follow up     HPI:   Alvin Dyer is a 53 y.o. male  Patient is seen in the office today for hospital follow-up.  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  Patient currently is having difficulty speaking and swallowing.  Patient denies any weakness in arms or leg, but is seen in a wheelchair today.  Patient presents with significant other.  She answered most of the questions for him today.  He denies any concerns at this time.  Medications were refilled as needed.  Significant other will follow-up on test that were ordered by [facility] neurologist.  They also have home care that is starting this week.    The patient denies headaches, lightheadedness, changes in speech/vision, photophobia, dysphagia, diaphoresis, Chest pain, dyspnea, Abdominal pain, recent falls or trauma, and changes in bowel/urinary habits.     ROS:   12 points review of system is negative excepted as stated above in the HPI.    Medical History      PMH:    has a past medical history of Anemia, unspecified (10/29/2021), Chronic kidney disease, unspecified (05/11/2022), Epistaxis,  Essential (primary) hypertension (08/11/2022), Nontraumatic intracerebral hemorrhage, unspecified (Multi) (12/02/2016), Other forms of dyspnea (02/18/2016), Personal history of other diseases of the circulatory system, Personal history of other endocrine, nutritional and metabolic disease (11/18/2016), and Personal history of transient ischemic attack (TIA), and cerebral infarction without residual deficits.   Allergies:   Allergies   Allergen Reactions    Ace Inhibitors Cough      Surgical Hx:   Past Surgical History:   Procedure Laterality Date    APPENDECTOMY  12/02/2016    Appendectomy    MR HEAD ANGIO WO IV CONTRAST  11/19/2020    MR HEAD ANGIO WO IV CONTRAST 11/19/2020 [facility]    MR HEAD ANGIO WO IV CONTRAST  2/23/2021    MR HEAD ANGIO WO IV CONTRAST 2/23/2021 [facility]    MR NECK ANGIO WO IV CONTRAST  11/19/2020    MR NECK ANGIO WO IV CONTRAST 11/19/2020 [facility]    MR NECK ANGIO WO IV CONTRAST  2/23/2021    MR NECK ANGIO WO IV CONTRAST 2/23/2021 [facility] CL[facility]ICAL [facility]GAC[facility]      Social HX:   Social History     Tobacco Use    Smoking status: Never    Smokeless tobacco: Never        MEDS:   Current Outpatient Medications   Medication Instructions    albuterol 90 mcg/actuation inhaler inhalation    albuterol 2.5 mg, nebulization, 4 times daily PRN    allopurinol (ZYLOPRIM) 100 mg, oral, Daily    amLODIPine (NORVASC) 5 mg, oral, Daily    calcitriol (ROCALTROL) 0.25 mcg, oral, Every other day    carvedilol (COREG) 25 mg, oral, 2 times daily    cloNIDine (CATAPRES) 0.1 mg, oral, Every 8 hours    insulin glargine (Basaglar KwikPen U-100 Insulin) 100 unit/mL (3 mL) pen subcutaneous, 10 units at bedtime    nebulizer accessories kit 1 kit, miscellaneous, 3 times daily before meals and nightly    nebulizer accessories misc 1 kit, miscellaneous, 2 times daily    nebulizer and compressor device 1 each, miscellaneous, 2 times daily, MAYTE Evans    sennosides-docusate sodium (Shayy-Colace)  8.6-50 mg tablet 2 tablets, 2 times daily        Objective Data     Objective:   Visit Vitals  /88        Physical Examination:   GE; sitting comfortably in a wheel chair, in NAD, drooling noted  HEENT; AT/NC, no conjunctival pallor, anicteric sclera  Neck; Supple neck, no LAD/JVD  Chest; CTAbl, no adventitious sounds  CVS; s1 and s2 only no MGR, RRR      Last Labs:   CBC:   WBC   Date Value Ref Range Status   08/15/2023 7.6 4.4 - 11.3 x10E9/L Final   01/11/2022 8.3 4.4 - 11.3 x10E9/L Final   10/29/2021 6.7 4.4 - 11.3 x10E9/L Final     Hemoglobin   Date Value Ref Range Status   08/15/2023 14.3 13.5 - 17.5 g/dL Final   01/11/2022 14.3 13.5 - 17.5 g/dL Final   10/29/2021 14.6 13.5 - 17.5 g/dL Final     MCV   Date Value Ref Range Status   08/15/2023 90 80 - 100 fL Final   01/11/2022 87 80 - 100 fL Final   10/29/2021 91 80 - 100 fL Final     Platelets   Date Value Ref Range Status   08/15/2023 197 150 - 450 x10E9/L Final   01/11/2022 235 150 - 450 x10E9/L Final   10/29/2021 215 150 - 450 x10E9/L Final      CMP:   Sodium   Date Value Ref Range Status   02/15/2024 139 136 - 145 mmol/L Final   09/28/2023 142 136 - 145 mmol/L Final   08/15/2023 142 136 - 145 mmol/L Final     Potassium   Date Value Ref Range Status   02/15/2024 3.3 (L) 3.5 - 5.3 mmol/L Final   09/28/2023 3.7 3.5 - 5.3 mmol/L Final   08/15/2023 3.4 (L) 3.5 - 5.3 mmol/L Final     Chloride   Date Value Ref Range Status   02/15/2024 101 98 - 107 mmol/L Final   09/28/2023 103 98 - 107 mmol/L Final   08/15/2023 105 98 - 107 mmol/L Final     Urea Nitrogen   Date Value Ref Range Status   02/15/2024 23 6 - 23 mg/dL Final   09/28/2023 24 (H) 6 - 23 mg/dL Final   08/15/2023 18 6 - 23 mg/dL Final     Creatinine   Date Value Ref Range Status   02/15/2024 1.87 (H) 0.50 - 1.30 mg/dL Final   09/28/2023 1.96 (H) 0.50 - 1.30 mg/dL Final   08/15/2023 1.99 (H) 0.50 - 1.30 mg/dL Final     eGFR   Date Value Ref Range Status   02/15/2024 42 (L) >60 mL/min/1.73m*2 Final      Comment:     Calculations of estimated GFR are performed using the 2021 CKD-EPI Study Refit equation without the race variable for the IDMS-Traceable creatinine methods.  https://jasn.asnjournals.org/content/early/2021/09/22/ASN.4887904825      A1c:   Hemoglobin A1C   Date Value Ref Range Status   04/11/2024 7.2 (H) <5.7 % Final     Comment:     Normal less than 5.7%  Prediabetes 5.7% to 6.4%  Diabetes 6.5% or higher      --HgbA1C levels may not be accurate in patients who have renal disease, received recent blood transfusions, are anemic, or who have dyshemoglobinemia.   08/15/2023 6.5 (A) % Final     Comment:          Diagnosis of Diabetes-Adults   Non-Diabetic: < or = 5.6%   Increased risk for developing diabetes: 5.7-6.4%   Diagnostic of diabetes: > or = 6.5%  .       Monitoring of Diabetes                Age (y)     Therapeutic Goal (%)   Adults:          >18           <7.0   Pediatrics:    13-18           <7.5                   7-12           <8.0                   0- 6            7.5-8.5   American Diabetes Association. Diabetes Care 33(S1), Jan 2010.   10/29/2021 6.0 (A) % Final     Comment:          Diagnosis of Diabetes-Adults   Non-Diabetic: < or = 5.6%   Increased risk for developing diabetes: 5.7-6.4%   Diagnostic of diabetes: > or = 6.5%  .       Monitoring of Diabetes                Age (y)     Therapeutic Goal (%)   Adults:          >18           <7.0   Pediatrics:    13-18           <7.5                   7-12           <8.0                   0- 6            7.5-8.5   American Diabetes Association. Diabetes Care 33(S1), Jan 2010.          Other labs;   Vit D:   Vitamin D, 25-Hydroxy   Date Value Ref Range Status   09/28/2023 46 ng/mL Final     Comment:     .  DEFICIENCY:         < 20   NG/ML  INSUFFICIENCY:      20-29  NG/ML  SUFFICIENCY:         NG/ML    THIS ASSAY ACCURATELY QUANTIFIES THE SUM OF  VITAMIN D3, 25-HYDROXY AND VIT D2,25-HYDROXY.     08/15/2023 17 (A) ng/mL Final     Comment:      .  DEFICIENCY:         < 20   NG/ML  INSUFFICIENCY:      20-29  NG/ML  SUFFICIENCY:         NG/ML    THIS ASSAY ACCURATELY QUANTIFIES THE SUM OF  VITAMIN D3, 25-HYDROXY AND VIT D2,25-HYDROXY.   10/29/2021 12 (A) ng/mL Final     Comment:     .  DEFICIENCY:         < 20   NG/ML  INSUFFICIENCY:      20-29  NG/ML  SUFFICIENCY:         NG/ML    THIS ASSAY ACCURATELY QUANTIFIES THE SUM OF  VITAMIN D3, 25-HYDROXY AND VIT D2,25-HYDROXY.        TSH:  TSH   Date Value Ref Range Status   08/15/2023 1.80 0.44 - 3.98 mIU/L Final     Comment:      TSH testing is performed using different testing    methodology at [facility] than at other    [facility]. Direct result comparisons should    only be made within the same method.   10/29/2021 1.68 0.44 - 3.98 mIU/L Final     Comment:      TSH testing is performed using different testing    methodology at [facility] than at other    [facility]. Direct result comparisons should    only be made within the same method.          Assessment and Plan     Problem List Items Addressed This Visit       Anemia    Relevant Orders    Ferritin    Iron and TIBC    CBC and Auto Differential    Vitamin B12    Uncontrolled hypertension - Primary     Blood pressure controlled in office.         Relevant Medications    cloNIDine (Catapres) 0.1 mg tablet    amLODIPine (Norvasc) 5 mg tablet    carvedilol (Coreg) 25 mg tablet    Diabetes mellitus type 2, controlled (Multi)     Last A1c was drawn 1 month ago was 7.2%  They have a log in the office and blood sugars look pretty well-controlled with current regimen         Intracerebral hemorrhage (Multi)    Stage 3b chronic kidney disease (Multi)    History of stroke     Other Visit Diagnoses       Bronchitis        Relevant Medications    albuterol 2.5 mg /3 mL (0.083 %) nebulizer solution    nebulizer accessories kit    nebulizer accessories misc            Tammy Villatoro DO

## 2024-05-30 NOTE — PROGRESS NOTES
Alvin Dyer   53 y.o.    @@  [facility]/Room: [id number]/Room/bed info not found    Subjective:   The patient is being seen for a routine clinic follow-up of chronic kidney disease. Recently, the disease has been stable. Disease complications:  No hyperkalemia, no hypocalcemia, no hyperphosphatemia, no metabolic acidosis, no coagulopathy, no uremic encephalopathy, no neuropathy and no renal osteodystrophy. The patient is currently asymptomatic. No associated symptoms are reported.       Meds:   Current Outpatient Medications   Medication Sig Dispense Refill    albuterol 90 mcg/actuation inhaler Inhale.      allopurinol (Zyloprim) 100 mg tablet Take 1 tablet (100 mg) by mouth once daily.      amLODIPine (Norvasc) 5 mg tablet Take 1 tablet (5 mg) by mouth once daily.      carvedilol (Coreg) 25 mg tablet Take 1 tablet (25 mg) by mouth 2 times a day. 180 tablet 1    cloNIDine (Catapres) 0.1 mg tablet 1 tablet (0.1 mg). Every 8 hrs      hydrALAZINE (Apresoline) 100 mg tablet Take 1.5 tablets (150 mg) by mouth 3 times a day. (Patient taking differently: Take 1 tablet (100 mg) by mouth 3 times a day.) 405 tablet 3    insulin glargine (Basaglar KwikPen U-100 Insulin) 100 unit/mL (3 mL) pen Inject under the skin. 10 units at bedtime      sennosides-docusate sodium (Shayy-Colace) 8.6-50 mg tablet 2 tablets 2 times a day.       No current facility-administered medications for this visit.          ROS:  The patient is awake and oriented. No dizziness or lightheadedness. No chills and no fever. No headaches. No nausea and no vomiting. No shortness of breath. No cough. No sputum. No chest pain. No chest tightness. No abdominal pain. No diarrhea and no constipation. No hematemesis or hemoptysis. No hematuria. No rectal bleeding. No melena. No epistaxis. No urinary symptoms. No flank pain. No leg edema. No leg pain. No weakness. No itching. Overall, the rest of the review of systems is also negative.  12 point review of systems  otherwise negative as stated in HPI.        Physical Examination:      There were no vitals filed for this visit.  General: The patient is awake, oriented, and is not in any distress.  Head and Neck: Normocephalic. No periorbital edema.  Eyes: non-icteric  Respiratory: Symmetric air entry. Symmetric chest expansion.No respiratory distress.  Cardiovascular: Symmetric peripheral pulses.  Skin: No maculopapular rash.  Abdomen: soft, nt/nd  Musculoskeletal: No peripheral edema in both left and right upper extremities.  No edema in either left or right lower extremities.  Neuro Exam: Speech is fluent. Moves extremities.    Imaging:  === 09/28/23 ===    US RENAL COMPLETE    - Impression -  Limited exam.    No hydronephrosis bilaterally.    No focal urinary bladder abnormality identified.  No significant  postvoid residual.    MACRO:  None.       Blood Labs:  No results found for this or any previous visit (from the past 24 hour(s)).   Lab Results   Component Value Date    .2 (H) 02/15/2024    PROTUR NEGATIVE 09/28/2023    PROTUR 16 09/28/2023    PHOS 2.3 (L) 09/28/2023      Lab Results   Component Value Date    GLUCOSE 175 (H) 02/15/2024    CALCIUM 9.5 02/15/2024     02/15/2024    K 3.3 (L) 02/15/2024    CO2 27 02/15/2024     02/15/2024    BUN 23 02/15/2024    CREATININE 1.87 (H) 02/15/2024         Assessment and Plan:  1. Chronic kidney disease stage III. Last creatinine level  is 2.33.  This is worse than his previous visit.  He had a recent admission because of a stroke.  Volume status is fine.  Blood pressure is good.  Normal potassium and bicarb level.    2. Hypertension. Blood pressure is under control. We talked about low-salt diet. He also has a sleep apnea but is not using CPAP machine.      3.  Secondary hyperparathyroidism.  He used to be on calcitriol but during the recent admission he was taken off calcitriol.  I resumed his calcitriol.    I will see him in about 4 months for follow-up.            Gerard Valerio MD  Senior Attending Physician  Director of Onco-Nephrology Program  Division of Nephrology & Hypertension  [facility]

## 2024-05-31 ENCOUNTER — TELEPHONE (OUTPATIENT)
Dept: PRIMARY CARE | Facility: CLINIC | Age: 54
End: 2024-05-31
Payer: COMMERCIAL

## 2024-05-31 DIAGNOSIS — J40 BRONCHITIS: Primary | ICD-10-CM

## 2024-05-31 RX ORDER — NEBULIZER AND COMPRESSOR
1 EACH MISCELLANEOUS 2 TIMES DAILY
Qty: 1 EACH | Refills: 0 | Status: SHIPPED | OUTPATIENT
Start: 2024-05-31

## 2024-05-31 NOTE — TELEPHONE ENCOUNTER
Adriana is calling.   Pt has been taking stool softners and is still unable to use the bathroom.    Wants to know if there is something else you recommend?

## 2024-05-31 NOTE — TELEPHONE ENCOUNTER
Returned call  Patient to start miralax BID  No current nausea or abdominal pain  Increase PO fluid intake, states he has not been drinking a lot.   Call if no BM in 2 days.

## 2024-06-04 NOTE — ASSESSMENT & PLAN NOTE
Last A1c was drawn 1 month ago was 7.2%  They have a log in the office and blood sugars look pretty well-controlled with current regimen

## 2024-06-11 RX ORDER — NEBULIZER AND COMPRESSOR
EACH MISCELLANEOUS
Qty: 1 EACH | Refills: 0 | Status: SHIPPED | OUTPATIENT
Start: 2024-06-11

## 2024-07-23 ENCOUNTER — APPOINTMENT (OUTPATIENT)
Dept: PRIMARY CARE | Facility: CLINIC | Age: 54
End: 2024-07-23
Payer: COMMERCIAL

## 2024-07-23 VITALS
BODY MASS INDEX: 42.27 KG/M2 | WEIGHT: 278 LBS | DIASTOLIC BLOOD PRESSURE: 84 MMHG | SYSTOLIC BLOOD PRESSURE: 158 MMHG | HEART RATE: 64 BPM

## 2024-07-23 DIAGNOSIS — R47.01 APHASIA: ICD-10-CM

## 2024-07-23 DIAGNOSIS — I61.9 NONTRAUMATIC INTRACEREBRAL HEMORRHAGE, UNSPECIFIED CEREBRAL LOCATION, UNSPECIFIED LATERALITY (MULTI): Primary | ICD-10-CM

## 2024-07-23 DIAGNOSIS — K59.03 DRUG-INDUCED CONSTIPATION: ICD-10-CM

## 2024-07-23 DIAGNOSIS — I10 UNCONTROLLED HYPERTENSION: ICD-10-CM

## 2024-07-23 DIAGNOSIS — M1A.9XX0 CHRONIC GOUT WITHOUT TOPHUS, UNSPECIFIED CAUSE, UNSPECIFIED SITE: ICD-10-CM

## 2024-07-23 PROCEDURE — 3079F DIAST BP 80-89 MM HG: CPT | Performed by: INTERNAL MEDICINE

## 2024-07-23 PROCEDURE — 3077F SYST BP >= 140 MM HG: CPT | Performed by: INTERNAL MEDICINE

## 2024-07-23 PROCEDURE — 99214 OFFICE O/P EST MOD 30 MIN: CPT | Performed by: INTERNAL MEDICINE

## 2024-07-23 RX ORDER — ALLOPURINOL 100 MG/1
100 TABLET ORAL DAILY
Qty: 90 TABLET | Refills: 1 | Status: SHIPPED | OUTPATIENT
Start: 2024-07-23

## 2024-07-23 RX ORDER — AMLODIPINE BESYLATE 5 MG/1
5 TABLET ORAL DAILY
Qty: 90 TABLET | Refills: 1 | Status: SHIPPED | OUTPATIENT
Start: 2024-07-23 | End: 2024-07-23 | Stop reason: SDUPTHER

## 2024-07-23 RX ORDER — HYDRALAZINE HYDROCHLORIDE 100 MG/1
100 TABLET, FILM COATED ORAL 3 TIMES DAILY
Qty: 270 TABLET | Refills: 1 | Status: SHIPPED | OUTPATIENT
Start: 2024-07-23 | End: 2025-01-19

## 2024-07-23 RX ORDER — CARVEDILOL 25 MG/1
25 TABLET ORAL 2 TIMES DAILY
Qty: 180 TABLET | Refills: 1 | Status: SHIPPED | OUTPATIENT
Start: 2024-07-23

## 2024-07-23 RX ORDER — CLONIDINE HYDROCHLORIDE 0.1 MG/1
0.1 TABLET ORAL EVERY 8 HOURS
Qty: 270 TABLET | Refills: 1 | Status: SHIPPED | OUTPATIENT
Start: 2024-07-23

## 2024-07-23 RX ORDER — AMOXICILLIN 250 MG
2 CAPSULE ORAL DAILY
Qty: 90 TABLET | Refills: 1 | Status: SHIPPED | OUTPATIENT
Start: 2024-07-23

## 2024-07-23 RX ORDER — AMLODIPINE BESYLATE 10 MG/1
10 TABLET ORAL DAILY
Qty: 90 TABLET | Refills: 1 | Status: SHIPPED | OUTPATIENT
Start: 2024-07-23

## 2024-07-23 NOTE — LETTER
July 23, 2024     Patient: Alvin Dyer   YOB: 1970   Date of Visit: 7/23/2024       To Whom It May Concern:    Alvin Dyer was seen in my clinic on 7/23/2024 at 11:40 am. Please excuse Alvin for his absence from work until August 23, 2024 due to patient having a stroke and unable to work.    If you have any questions or concerns, please don't hesitate to call.         Sincerely,       Tammy Villatoro, DO

## 2024-07-23 NOTE — LETTER
July 23, 2024     Patient: Alvin Dyer   YOB: 1970   Date of Visit: 7/23/2024       To Whom It May Concern:    Alvin Dyer was seen in my clinic on 7/23/2024 at 11:40 am. Please excuse Alvin for his absence from work until August 23, 2024. Patient is unable to work due to having a stroke and his aphasia.     If you have any questions or concerns, please don't hesitate to call.         Sincerely,       Tammy Villatoro, DO

## 2024-07-23 NOTE — PROGRESS NOTES
Primary care office Note      Name: Alvin Dyer, Age: 53 y.o., Gender: male, MRN: [id number]   Pharmacy:   [id number] - [place], [place] - [address]  [address] [facility] [address]  Phone: [phone] Fax: [phone]    [id number] - [place], [place] - [address]  [address]  [facility] [address]  Phone: [phone] Fax: [phone]    [facility] - [place], [place] - [address]  [address]  [facility] [address]  Phone: [phone] Fax: [phone]     PCP: Tammy Villatoro        Subjective:   Chief Complaint   Patient presents with    Follow-up        Alvin Dyer is a 53 y.o. male who presented to the clinic today for follow up     HPI:   Alvin Dyer is a 53 y.o. male   Pt would like to continue physical therapy and speech therapy outpatient. He completes his home therapy and it was helping him.  Will fax order for PT and ST Fax number:[phone]    Otherwise patient is doing well. Taking all meds as prescribed.    The patient denies headaches, lightheadedness, changes in speech/vision, photophobia, dysphagia, diaphoresis, Chest pain, dyspnea, Abdominal pain, recent falls or trauma, and changes in bowel/urinary habits.     ROS:   12 points review of system is negative excepted as stated above in the HPI.    Medical History      PMH:    has a past medical history of Anemia, unspecified (10/29/2021), Chronic kidney disease, unspecified (05/11/2022), Epistaxis, Essential (primary) hypertension (08/11/2022), Nontraumatic intracerebral hemorrhage, unspecified (Multi) (12/02/2016), Other forms of dyspnea (02/18/2016), Personal history of other diseases of the circulatory system, Personal history of other endocrine, nutritional and metabolic disease (11/18/2016), and Personal history of transient ischemic attack (TIA), and cerebral infarction without residual deficits.   Allergies:   Allergies   Allergen Reactions    Ace Inhibitors Cough       Surgical Hx:   Past Surgical History:   Procedure Laterality Date    APPENDECTOMY  12/02/2016    Appendectomy    MR HEAD ANGIO WO IV CONTRAST  11/19/2020    MR HEAD ANGIO WO IV CONTRAST 11/19/2020 [facility]    MR HEAD ANGIO WO IV CONTRAST  2/23/2021    MR HEAD ANGIO WO IV CONTRAST 2/23/2021 [facility]    MR NECK ANGIO WO IV CONTRAST  11/19/2020    MR NECK ANGIO WO IV CONTRAST 11/19/2020 [facility]    MR NECK ANGIO WO IV CONTRAST  2/23/2021    MR NECK ANGIO WO IV CONTRAST 2/23/2021 [facility] CLINICAL [facility]      Social HX:   Social History     Tobacco Use    Smoking status: Never    Smokeless tobacco: Never        MEDS:   Current Outpatient Medications   Medication Instructions    albuterol 90 mcg/actuation inhaler inhalation    albuterol 2.5 mg, nebulization, 4 times daily PRN    allopurinol (ZYLOPRIM) 100 mg, oral, Daily    amLODIPine (NORVASC) 10 mg, oral, Daily    carvedilol (COREG) 25 mg, oral, 2 times daily    cloNIDine (CATAPRES) 0.1 mg, oral, Every 8 hours    hydrALAZINE (APRESOLINE) 100 mg, oral, 3 times daily    insulin glargine (Basaglar KwikPen U-100 Insulin) 100 unit/mL (3 mL) pen subcutaneous, 10 units at bedtime    nebulizer accessories kit 1 kit, miscellaneous, 3 times daily before meals and nightly    nebulizer accessories misc 1 kit, miscellaneous, 2 times daily    nebulizer and compressor device 1 each, miscellaneous, 2 times daily, T Nathan    nebulizer and compressor device Use 4 times a day PRN wheezing    sennosides-docusate sodium (Shayy-Colace) 8.6-50 mg tablet 2 tablets, oral, Daily        Objective Data     Objective:   Visit Vitals  /84   Pulse 64        Physical Examination:   GE; sitting comfortably in a chair, in NAD, drooling    Last Labs:   CBC:   WBC   Date Value Ref Range Status   08/15/2023 7.6 4.4 - 11.3 x10E9/L Final   01/11/2022 8.3 4.4 - 11.3 x10E9/L Final   10/29/2021 6.7 4.4 - 11.3 x10E9/L Final     Hemoglobin   Date Value Ref Range  Status   08/15/2023 14.3 13.5 - 17.5 g/dL Final   01/11/2022 14.3 13.5 - 17.5 g/dL Final   10/29/2021 14.6 13.5 - 17.5 g/dL Final     MCV   Date Value Ref Range Status   08/15/2023 90 80 - 100 fL Final   01/11/2022 87 80 - 100 fL Final   10/29/2021 91 80 - 100 fL Final     Platelets   Date Value Ref Range Status   08/15/2023 197 150 - 450 x10E9/L Final   01/11/2022 235 150 - 450 x10E9/L Final   10/29/2021 215 150 - 450 x10E9/L Final      CMP:   Sodium   Date Value Ref Range Status   02/15/2024 139 136 - 145 mmol/L Final   09/28/2023 142 136 - 145 mmol/L Final   08/15/2023 142 136 - 145 mmol/L Final     Potassium   Date Value Ref Range Status   02/15/2024 3.3 (L) 3.5 - 5.3 mmol/L Final   09/28/2023 3.7 3.5 - 5.3 mmol/L Final   08/15/2023 3.4 (L) 3.5 - 5.3 mmol/L Final     Chloride   Date Value Ref Range Status   02/15/2024 101 98 - 107 mmol/L Final   09/28/2023 103 98 - 107 mmol/L Final   08/15/2023 105 98 - 107 mmol/L Final     Urea Nitrogen   Date Value Ref Range Status   02/15/2024 23 6 - 23 mg/dL Final   09/28/2023 24 (H) 6 - 23 mg/dL Final   08/15/2023 18 6 - 23 mg/dL Final     Creatinine   Date Value Ref Range Status   02/15/2024 1.87 (H) 0.50 - 1.30 mg/dL Final   09/28/2023 1.96 (H) 0.50 - 1.30 mg/dL Final   08/15/2023 1.99 (H) 0.50 - 1.30 mg/dL Final     eGFR   Date Value Ref Range Status   02/15/2024 42 (L) >60 mL/min/1.73m*2 Final     Comment:     Calculations of estimated GFR are performed using the 2021 CKD-EPI Study Refit equation without the race variable for the IDMS-Traceable creatinine methods.  https://jasn.asnjournals.org/content/early/2021/09/22/ASN.5696554238      A1c:   Hemoglobin A1C   Date Value Ref Range Status   04/11/2024 7.2 (H) <5.7 % Final     Comment:     Normal less than 5.7%  Prediabetes 5.7% to 6.4%  Diabetes 6.5% or higher      --HgbA1C levels may not be accurate in patients who have renal disease, received recent blood transfusions, are anemic, or who have dyshemoglobinemia.    08/15/2023 6.5 (A) % Final     Comment:          Diagnosis of Diabetes-Adults   Non-Diabetic: < or = 5.6%   Increased risk for developing diabetes: 5.7-6.4%   Diagnostic of diabetes: > or = 6.5%  .       Monitoring of Diabetes                Age (y)     Therapeutic Goal (%)   Adults:          >18           <7.0   Pediatrics:    13-18           <7.5                   7-12           <8.0                   0- 6            7.5-8.5   American Diabetes Association. Diabetes Care 33(S1), Jan 2010.   10/29/2021 6.0 (A) % Final     Comment:          Diagnosis of Diabetes-Adults   Non-Diabetic: < or = 5.6%   Increased risk for developing diabetes: 5.7-6.4%   Diagnostic of diabetes: > or = 6.5%  .       Monitoring of Diabetes                Age (y)     Therapeutic Goal (%)   Adults:          >18           <7.0   Pediatrics:    13-18           <7.5                   7-12           <8.0                   0- 6            7.5-8.5   American Diabetes Association. Diabetes Care 33(S1), Jan 2010.          Other labs;   Vit D:   Vitamin D, 25-Hydroxy   Date Value Ref Range Status   09/28/2023 46 ng/mL Final     Comment:     .  DEFICIENCY:         < 20   NG/ML  INSUFFICIENCY:      20-29  NG/ML  SUFFICIENCY:         NG/ML    THIS ASSAY ACCURATELY QUANTIFIES THE SUM OF  VITAMIN D3, 25-HYDROXY AND VIT D2,25-HYDROXY.     08/15/2023 17 (A) ng/mL Final     Comment:     .  DEFICIENCY:         < 20   NG/ML  INSUFFICIENCY:      20-29  NG/ML  SUFFICIENCY:         NG/ML    THIS ASSAY ACCURATELY QUANTIFIES THE SUM OF  VITAMIN D3, 25-HYDROXY AND VIT D2,25-HYDROXY.   10/29/2021 12 (A) ng/mL Final     Comment:     .  DEFICIENCY:         < 20   NG/ML  INSUFFICIENCY:      20-29  NG/ML  SUFFICIENCY:         NG/ML    THIS ASSAY ACCURATELY QUANTIFIES THE SUM OF  VITAMIN D3, 25-HYDROXY AND VIT D2,25-HYDROXY.        TSH:  TSH   Date Value Ref Range Status   08/15/2023 1.80 0.44 - 3.98 mIU/L Final     Comment:      TSH testing is  performed using different testing    methodology at [facility] than at other    system [facility] [place]. Direct result comparisons should    only be made within the same method.   10/29/2021 1.68 0.44 - 3.98 mIU/L Final     Comment:      TSH testing is performed using different testing    methodology at [facility] than at other    [facility] hospitals. Direct result comparisons should    only be made within the same method.          Assessment and Plan     Problem List Items Addressed This Visit       Uncontrolled hypertension     Blood pressure is high today   Will increase his amlodipine to 10 mg daily         Relevant Medications    carvedilol (Coreg) 25 mg tablet    cloNIDine (Catapres) 0.1 mg tablet    hydrALAZINE (Apresoline) 100 mg tablet    amLODIPine (Norvasc) 10 mg tablet    Intracerebral hemorrhage (Multi) - Primary    Relevant Orders    Referral to Physical Therapy    Referral to Speech Therapy    Disability Placard    Aphasia     ST ordered          Chronic gout without tophus    Relevant Medications    allopurinol (Zyloprim) 100 mg tablet    Drug-induced constipation    Relevant Medications    sennosides-docusate sodium (Shayy-Colace) 8.6-50 mg tablet       Tammy Villatoro DO

## 2024-08-22 ENCOUNTER — APPOINTMENT (OUTPATIENT)
Dept: PRIMARY CARE | Facility: CLINIC | Age: 54
End: 2024-08-22
Payer: COMMERCIAL

## 2024-08-22 VITALS
DIASTOLIC BLOOD PRESSURE: 87 MMHG | SYSTOLIC BLOOD PRESSURE: 144 MMHG | WEIGHT: 279 LBS | HEART RATE: 69 BPM | BODY MASS INDEX: 42.42 KG/M2

## 2024-08-22 DIAGNOSIS — Z86.73 HISTORY OF STROKE: ICD-10-CM

## 2024-08-22 DIAGNOSIS — Z79.4 CONTROLLED TYPE 2 DIABETES MELLITUS WITHOUT COMPLICATION, WITH LONG-TERM CURRENT USE OF INSULIN (MULTI): Primary | ICD-10-CM

## 2024-08-22 DIAGNOSIS — E11.9 CONTROLLED TYPE 2 DIABETES MELLITUS WITHOUT COMPLICATION, WITH LONG-TERM CURRENT USE OF INSULIN (MULTI): Primary | ICD-10-CM

## 2024-08-22 DIAGNOSIS — R47.01 APHASIA: ICD-10-CM

## 2024-08-22 PROCEDURE — 99214 OFFICE O/P EST MOD 30 MIN: CPT | Performed by: INTERNAL MEDICINE

## 2024-08-22 PROCEDURE — 3077F SYST BP >= 140 MM HG: CPT | Performed by: INTERNAL MEDICINE

## 2024-08-22 PROCEDURE — 3079F DIAST BP 80-89 MM HG: CPT | Performed by: INTERNAL MEDICINE

## 2024-08-22 RX ORDER — INSULIN GLARGINE 100 [IU]/ML
10 INJECTION, SOLUTION SUBCUTANEOUS NIGHTLY
Qty: 9 ML | Refills: 1 | Status: SHIPPED | OUTPATIENT
Start: 2024-08-22

## 2024-08-22 ASSESSMENT — LIFESTYLE VARIABLES
SKIP TO QUESTIONS 9-10: 1
HOW OFTEN DO YOU HAVE A DRINK CONTAINING ALCOHOL: NEVER
HOW OFTEN DO YOU HAVE SIX OR MORE DRINKS ON ONE OCCASION: NEVER
AUDIT-C TOTAL SCORE: 0
HOW MANY STANDARD DRINKS CONTAINING ALCOHOL DO YOU HAVE ON A TYPICAL DAY: PATIENT DOES NOT DRINK

## 2024-08-22 ASSESSMENT — PATIENT HEALTH QUESTIONNAIRE - PHQ9
2. FEELING DOWN, DEPRESSED OR HOPELESS: NOT AT ALL
1. LITTLE INTEREST OR PLEASURE IN DOING THINGS: NOT AT ALL
SUM OF ALL RESPONSES TO PHQ9 QUESTIONS 1 AND 2: 0

## 2024-08-22 NOTE — PROGRESS NOTES
Primary care office Note      Name: Alvin Dyer, Age: 53 y.o., Gender: male, MRN: [id number]   Pharmacy:   [id number] - [place], [place] - [address]  [address] [place] [address] [address]  Phone: [phone] Fax: [phone]    [id number] - [place], [place] - [address]  [address]  [facility] [address]  Phone: [phone] Fax: [phone]    [facility] - [place], [place] - [address]  [address]  [facility] [address]  Phone: [phone] Fax: [phone]     PCP: Tammy Villatoro        Subjective:   Chief Complaint   Patient presents with    Follow-up        Alvin Dyer is a 53 y.o. male who presented to the clinic today for follow up     HPI:   Alvin Dyer is a 53 y.o. male   MRI and Ziopatch placed this week  Needs refill on insulin   Continues to improve with his PT/ST - this is helping a lot.      The patient denies headaches, lightheadedness, changes in speech/vision, photophobia, dysphagia, diaphoresis, Chest pain, dyspnea, Abdominal pain, recent falls or trauma, and changes in bowel/urinary habits.     ROS:   12 points review of system is negative excepted as stated above in the HPI.    Medical History      PMH:    has a past medical history of Anemia, unspecified (10/29/2021), Chronic kidney disease, unspecified (05/11/2022), Epistaxis, Essential (primary) hypertension (08/11/2022), Nontraumatic intracerebral hemorrhage, unspecified (Multi) (12/02/2016), Other forms of dyspnea (02/18/2016), Personal history of other diseases of the circulatory system, Personal history of other endocrine, nutritional and metabolic disease (11/18/2016), and Personal history of transient ischemic attack (TIA), and cerebral infarction without residual deficits.   Allergies:   Allergies   Allergen Reactions    Ace Inhibitors Cough      Surgical Hx:   Past Surgical History:   Procedure Laterality Date    APPENDECTOMY  12/02/2016    Appendectomy    MR HEAD ANGIO WO IV  CONTRAST  11/19/2020    MR HEAD ANGIO WO IV CONTRAST 11/19/2020 [facility]    MR HEAD ANGIO WO IV CONTRAST  2/23/2021    MR HEAD ANGIO WO IV CONTRAST 2/23/2021 [facility] CL[facility]ICAL [facility]    MR NECK ANGIO WO IV CONTRAST  11/19/2020    MR NECK ANGIO WO IV CONTRAST 11/19/2020 [facility]    MR NECK ANGIO WO IV CONTRAST  2/23/2021    MR NECK ANGIO WO IV CONTRAST 2/23/2021 [facility] CLINICAL [facility]GAC[facility]      Social HX:   Social History     Tobacco Use    Smoking status: Never     Passive exposure: Never    Smokeless tobacco: Never   Substance Use Topics    Alcohol use: Never    Drug use: Never        MEDS:   Current Outpatient Medications   Medication Instructions    albuterol 90 mcg/actuation inhaler inhalation    albuterol 2.5 mg, nebulization, 4 times daily PRN    allopurinol (ZYLOPRIM) 100 mg, oral, Daily    amLODIPine (NORVASC) 10 mg, oral, Daily    Basaglar KwikPen U-100 Insulin 10 Units, subcutaneous, Nightly, 10 units at bedtime    carvedilol (COREG) 25 mg, oral, 2 times daily    cloNIDine (CATAPRES) 0.1 mg, oral, Every 8 hours    hydrALAZINE (APRESOLINE) 100 mg, oral, 3 times daily    nebulizer accessories kit 1 kit, miscellaneous, 3 times daily before meals and nightly    nebulizer accessories misc 1 kit, miscellaneous, 2 times daily    nebulizer and compressor device 1 each, miscellaneous, 2 times daily, T [place]    nebulizer and compressor device Use 4 times a day PRN wheezing    sennosides-docusate sodium (Shayy-Colace) 8.6-50 mg tablet 2 tablets, oral, Daily        Objective Data     Objective:   Visit Vitals  /87 (BP Location: Left arm, Patient Position: Sitting, BP Cuff Size: Large adult)   Pulse 69        Physical Examination:   GE; sitting comfortably in a chair, drooling notes, slurred speech, in NAD  HEENT; AT/NC, no conjunctival pallor, anicteric sclera  Neck; Supple neck, no LAD/JVD  Chest; CTAbl, no adventitious sounds  CVS; s1 and s2 only no MGR, RRR  Ext; no cyanosis/clubbing/edema,  pulses intact  Neuro; Aox3  Psych; normal mood     Last Labs:   CBC:   WBC   Date Value Ref Range Status   08/15/2023 7.6 4.4 - 11.3 x10E9/L Final   01/11/2022 8.3 4.4 - 11.3 x10E9/L Final   10/29/2021 6.7 4.4 - 11.3 x10E9/L Final     Hemoglobin   Date Value Ref Range Status   08/15/2023 14.3 13.5 - 17.5 g/dL Final   01/11/2022 14.3 13.5 - 17.5 g/dL Final   10/29/2021 14.6 13.5 - 17.5 g/dL Final     MCV   Date Value Ref Range Status   08/15/2023 90 80 - 100 fL Final   01/11/2022 87 80 - 100 fL Final   10/29/2021 91 80 - 100 fL Final     Platelets   Date Value Ref Range Status   08/15/2023 197 150 - 450 x10E9/L Final   01/11/2022 235 150 - 450 x10E9/L Final   10/29/2021 215 150 - 450 x10E9/L Final      CMP:   Sodium   Date Value Ref Range Status   02/15/2024 139 136 - 145 mmol/L Final   09/28/2023 142 136 - 145 mmol/L Final   08/15/2023 142 136 - 145 mmol/L Final     Potassium   Date Value Ref Range Status   02/15/2024 3.3 (L) 3.5 - 5.3 mmol/L Final   09/28/2023 3.7 3.5 - 5.3 mmol/L Final   08/15/2023 3.4 (L) 3.5 - 5.3 mmol/L Final     Chloride   Date Value Ref Range Status   02/15/2024 101 98 - 107 mmol/L Final   09/28/2023 103 98 - 107 mmol/L Final   08/15/2023 105 98 - 107 mmol/L Final     Urea Nitrogen   Date Value Ref Range Status   02/15/2024 23 6 - 23 mg/dL Final   09/28/2023 24 (H) 6 - 23 mg/dL Final   08/15/2023 18 6 - 23 mg/dL Final     Creatinine   Date Value Ref Range Status   02/15/2024 1.87 (H) 0.50 - 1.30 mg/dL Final   09/28/2023 1.96 (H) 0.50 - 1.30 mg/dL Final   08/15/2023 1.99 (H) 0.50 - 1.30 mg/dL Final     eGFR   Date Value Ref Range Status   02/15/2024 42 (L) >60 mL/min/1.73m*2 Final     Comment:     Calculations of estimated GFR are performed using the 2021 CKD-EPI Study Refit equation without the race variable for the IDMS-Traceable creatinine methods.  https://jasn.asnjournals.org/content/early/2021/09/22/ASN.4606056113      A1c:   Hemoglobin A1C   Date Value Ref Range Status   04/11/2024 7.2  (H) <5.7 % Final     Comment:     Normal less than 5.7%  Prediabetes 5.7% to 6.4%  Diabetes 6.5% or higher      --HgbA1C levels may not be accurate in patients who have renal disease, received recent blood transfusions, are anemic, or who have dyshemoglobinemia.   08/15/2023 6.5 (A) % Final     Comment:          Diagnosis of Diabetes-Adults   Non-Diabetic: < or = 5.6%   Increased risk for developing diabetes: 5.7-6.4%   Diagnostic of diabetes: > or = 6.5%  .       Monitoring of Diabetes                Age (y)     Therapeutic Goal (%)   Adults:          >18           <7.0   Pediatrics:    13-18           <7.5                   7-12           <8.0                   0- 6            7.5-8.5   American Diabetes Association. Diabetes Care 33(S1), Jan 2010.   10/29/2021 6.0 (A) % Final     Comment:          Diagnosis of Diabetes-Adults   Non-Diabetic: < or = 5.6%   Increased risk for developing diabetes: 5.7-6.4%   Diagnostic of diabetes: > or = 6.5%  .       Monitoring of Diabetes                Age (y)     Therapeutic Goal (%)   Adults:          >18           <7.0   Pediatrics:    13-18           <7.5                   7-12           <8.0                   0- 6            7.5-8.5   American Diabetes Association. Diabetes Care 33(S1), Jan 2010.          Other labs;   Vit D:   Vitamin D, 25-Hydroxy   Date Value Ref Range Status   09/28/2023 46 ng/mL Final     Comment:     .  DEFICIENCY:         < 20   NG/ML  INSUFFICIENCY:      20-29  NG/ML  SUFFICIENCY:         NG/ML    THIS ASSAY ACCURATELY QUANTIFIES THE SUM OF  VITAMIN D3, 25-HYDROXY AND VIT D2,25-HYDROXY.     08/15/2023 17 (A) ng/mL Final     Comment:     .  DEFICIENCY:         < 20   NG/ML  INSUFFICIENCY:      20-29  NG/ML  SUFFICIENCY:         NG/ML    THIS ASSAY ACCURATELY QUANTIFIES THE SUM OF  VITAMIN D3, 25-HYDROXY AND VIT D2,25-HYDROXY.   10/29/2021 12 (A) ng/mL Final     Comment:     .  DEFICIENCY:         < 20   NG/ML  INSUFFICIENCY:      20-29   NG/ML  SUFFICIENCY:         NG/ML    THIS ASSAY ACCURATELY QUANTIFIES THE SUM OF  VITAMIN D3, 25-HYDROXY AND VIT D2,25-HYDROXY.        TSH:  TSH   Date Value Ref Range Status   08/15/2023 1.80 0.44 - 3.98 mIU/L Final     Comment:      TSH testing is performed using different testing    methodology at [facility] than at other    system [facility]. Direct result comparisons should    only be made within the same method.   10/29/2021 1.68 0.44 - 3.98 mIU/L Final     Comment:      TSH testing is performed using different testing    methodology at [facility] than at other    [facility]. Direct result comparisons should    only be made within the same method.          Assessment and Plan     Problem List Items Addressed This Visit       Diabetes mellitus type 2, controlled (Multi) - Primary    Relevant Medications    insulin glargine (Basaglar KwikPen U-100 Insulin) 100 unit/mL (3 mL) pen    History of stroke    Aphasia       Tammy Villatoro, DO

## 2024-08-22 NOTE — LETTER
August 22, 2024     Patient: Alvin Dyer   YOB: 1970   Date of Visit: 8/22/2024       To Whom It May Concern:    Alvin Dyer was seen in my clinic on 8/22/2024.  Please excuse Alvin for his absence from work until September 23, 2024 due to patient having a stroke and unable to work. The patient is unable to communicate effectively and currently have a heart monitor on.  If you have any questions or concerns, please don't hesitate to call.         Sincerely,     Tammy Villatoro, DO

## 2024-09-19 ENCOUNTER — APPOINTMENT (OUTPATIENT)
Dept: PRIMARY CARE | Facility: CLINIC | Age: 54
End: 2024-09-19
Payer: COMMERCIAL

## 2024-09-19 DIAGNOSIS — Z79.4 CONTROLLED TYPE 2 DIABETES MELLITUS WITHOUT COMPLICATION, WITH LONG-TERM CURRENT USE OF INSULIN (MULTI): Primary | ICD-10-CM

## 2024-09-19 DIAGNOSIS — E11.9 CONTROLLED TYPE 2 DIABETES MELLITUS WITHOUT COMPLICATION, WITH LONG-TERM CURRENT USE OF INSULIN (MULTI): Primary | ICD-10-CM

## 2024-09-19 PROCEDURE — 99214 OFFICE O/P EST MOD 30 MIN: CPT | Performed by: INTERNAL MEDICINE

## 2024-09-19 NOTE — PROGRESS NOTES
Subjective   Patient ID: Alvin Dyer is a 53 y.o. male who presents via virtual visit for Follow-up.  An interactive audio and video telecommunication system which permits real time communications between the patient (at the originating site) and provider (at the distant site) was utilized to provide this telehealth service.    Verbal consent was requested and obtained from the patient on the day of encounter.  HPI'  Doing well  No complaints  Has disability meeting tomorrow  Still not able to speak clearly or effectively - very slow and slurred  Drooling       Review of Systems   All other systems reviewed and are negative.      Objective   Physical Exam  Constitutional:       General: He is awake.      Appearance: Normal appearance. He is well-developed.      Comments: Slow speech, drooling    Neurological:      Mental Status: He is alert.   Psychiatric:         Mood and Affect: Mood normal.         Behavior: Behavior normal.         Assessment/Plan     Problem List Items Addressed This Visit       Diabetes mellitus type 2, controlled (Multi) - Primary    Relevant Orders    Hemoglobin A1C    Referral to Clinical Pharmacy

## 2024-09-19 NOTE — LETTER
September 19, 2024     Patient: Alvin Dyer   YOB: 1970   Date of Visit: 9/19/2024       To Whom It May Concern:     Alvin Dyer was seen in my clinic on 09/19/2024.  Please excuse Alvin for his absence from work until October 24, 2024 due to patient having a stroke and unable to work. The patient is unable to communicate effectively and currently has a heart monitor on.  If you have any questions or concerns, please don't hesitate to call.         Sincerely,         Tammy Villatoro,         CC: No Recipients

## 2024-09-20 ENCOUNTER — TELEPHONE (OUTPATIENT)
Dept: PRIMARY CARE | Facility: CLINIC | Age: 54
End: 2024-09-20
Payer: COMMERCIAL

## 2024-09-26 ENCOUNTER — APPOINTMENT (OUTPATIENT)
Dept: NEPHROLOGY | Facility: CLINIC | Age: 54
End: 2024-09-26
Payer: COMMERCIAL

## 2024-10-06 DIAGNOSIS — K59.03 DRUG-INDUCED CONSTIPATION: ICD-10-CM

## 2024-10-08 RX ORDER — NICOTINE POLACRILEX 4 MG
2 LOZENGE BUCCAL DAILY
Qty: 90 TABLET | Refills: 0 | Status: SHIPPED | OUTPATIENT
Start: 2024-10-08

## 2024-10-11 ENCOUNTER — LAB (OUTPATIENT)
Dept: LAB | Facility: LAB | Age: 54
End: 2024-10-11
Payer: COMMERCIAL

## 2024-10-11 DIAGNOSIS — I12.9 HYPERTENSIVE KIDNEY DISEASE WITH STAGE 3B CHRONIC KIDNEY DISEASE (MULTI): ICD-10-CM

## 2024-10-11 DIAGNOSIS — N18.32 STAGE 3B CHRONIC KIDNEY DISEASE (MULTI): ICD-10-CM

## 2024-10-11 DIAGNOSIS — N18.32 HYPERTENSIVE KIDNEY DISEASE WITH STAGE 3B CHRONIC KIDNEY DISEASE (MULTI): ICD-10-CM

## 2024-10-11 DIAGNOSIS — D64.9 ANEMIA, UNSPECIFIED TYPE: ICD-10-CM

## 2024-10-11 DIAGNOSIS — Z79.4 CONTROLLED TYPE 2 DIABETES MELLITUS WITHOUT COMPLICATION, WITH LONG-TERM CURRENT USE OF INSULIN (MULTI): ICD-10-CM

## 2024-10-11 DIAGNOSIS — E11.9 CONTROLLED TYPE 2 DIABETES MELLITUS WITHOUT COMPLICATION, WITH LONG-TERM CURRENT USE OF INSULIN (MULTI): ICD-10-CM

## 2024-10-11 DIAGNOSIS — I10 ESSENTIAL HYPERTENSION: ICD-10-CM

## 2024-10-11 LAB
ANION GAP SERPL CALC-SCNC: 15 MMOL/L (ref 10–20)
BASOPHILS # BLD AUTO: 0.04 X10*3/UL (ref 0–0.1)
BASOPHILS NFR BLD AUTO: 0.6 %
BUN SERPL-MCNC: 13 MG/DL (ref 6–23)
CALCIUM SERPL-MCNC: 9.5 MG/DL (ref 8.6–10.6)
CHLORIDE SERPL-SCNC: 104 MMOL/L (ref 98–107)
CO2 SERPL-SCNC: 26 MMOL/L (ref 21–32)
CREAT SERPL-MCNC: 1.85 MG/DL (ref 0.5–1.3)
EGFRCR SERPLBLD CKD-EPI 2021: 43 ML/MIN/1.73M*2
EOSINOPHIL # BLD AUTO: 0.27 X10*3/UL (ref 0–0.7)
EOSINOPHIL NFR BLD AUTO: 3.7 %
ERYTHROCYTE [DISTWIDTH] IN BLOOD BY AUTOMATED COUNT: 13.1 % (ref 11.5–14.5)
EST. AVERAGE GLUCOSE BLD GHB EST-MCNC: 140 MG/DL
FERRITIN SERPL-MCNC: 286 NG/ML (ref 20–300)
GLUCOSE SERPL-MCNC: 117 MG/DL (ref 74–99)
HBA1C MFR BLD: 6.5 %
HCT VFR BLD AUTO: 45.5 % (ref 41–52)
HGB BLD-MCNC: 14.5 G/DL (ref 13.5–17.5)
IMM GRANULOCYTES # BLD AUTO: 0 X10*3/UL (ref 0–0.7)
IMM GRANULOCYTES NFR BLD AUTO: 0 % (ref 0–0.9)
IRON SATN MFR SERPL: 24 % (ref 25–45)
IRON SERPL-MCNC: 68 UG/DL (ref 35–150)
LYMPHOCYTES # BLD AUTO: 2.25 X10*3/UL (ref 1.2–4.8)
LYMPHOCYTES NFR BLD AUTO: 31.1 %
MCH RBC QN AUTO: 29.1 PG (ref 26–34)
MCHC RBC AUTO-ENTMCNC: 31.9 G/DL (ref 32–36)
MCV RBC AUTO: 91 FL (ref 80–100)
MONOCYTES # BLD AUTO: 0.62 X10*3/UL (ref 0.1–1)
MONOCYTES NFR BLD AUTO: 8.6 %
NEUTROPHILS # BLD AUTO: 4.05 X10*3/UL (ref 1.2–7.7)
NEUTROPHILS NFR BLD AUTO: 56 %
NRBC BLD-RTO: 0 /100 WBCS (ref 0–0)
PLATELET # BLD AUTO: 238 X10*3/UL (ref 150–450)
POTASSIUM SERPL-SCNC: 3.6 MMOL/L (ref 3.5–5.3)
PTH-INTACT SERPL-MCNC: 126.8 PG/ML (ref 18.5–88)
RBC # BLD AUTO: 4.99 X10*6/UL (ref 4.5–5.9)
SODIUM SERPL-SCNC: 141 MMOL/L (ref 136–145)
TIBC SERPL-MCNC: 280 UG/DL (ref 240–445)
UIBC SERPL-MCNC: 212 UG/DL (ref 110–370)
VIT B12 SERPL-MCNC: 508 PG/ML (ref 211–911)
WBC # BLD AUTO: 7.2 X10*3/UL (ref 4.4–11.3)

## 2024-10-11 PROCEDURE — 83540 ASSAY OF IRON: CPT

## 2024-10-11 PROCEDURE — 36415 COLL VENOUS BLD VENIPUNCTURE: CPT

## 2024-10-11 PROCEDURE — 82607 VITAMIN B-12: CPT

## 2024-10-11 PROCEDURE — 82728 ASSAY OF FERRITIN: CPT

## 2024-10-11 PROCEDURE — 83036 HEMOGLOBIN GLYCOSYLATED A1C: CPT

## 2024-10-11 PROCEDURE — 83970 ASSAY OF PARATHORMONE: CPT

## 2024-10-11 PROCEDURE — 85025 COMPLETE CBC W/AUTO DIFF WBC: CPT

## 2024-10-11 PROCEDURE — 83550 IRON BINDING TEST: CPT

## 2024-10-11 PROCEDURE — 80048 BASIC METABOLIC PNL TOTAL CA: CPT

## 2024-10-11 PROCEDURE — 82306 VITAMIN D 25 HYDROXY: CPT

## 2024-10-14 DIAGNOSIS — E21.3 HYPERPARATHYROIDISM (MULTI): Primary | ICD-10-CM

## 2024-10-14 LAB — 25(OH)D3 SERPL-MCNC: 38 NG/ML (ref 30–100)

## 2024-10-14 RX ORDER — CALCITRIOL 0.25 UG/1
0.25 CAPSULE ORAL EVERY OTHER DAY
Qty: 45 CAPSULE | Refills: 3 | Status: SHIPPED | OUTPATIENT
Start: 2024-10-14 | End: 2025-10-14

## 2024-10-19 NOTE — PROGRESS NOTES
Patient is sent at the request of Tammy Villatoro DO for my opinion regarding Type 2 diabetes.  My final recommendations will be communicated back to the requesting provider by way of shared medical record.    Subjective     HPI: Patient had a stroke in April causing slurred speech and limiting mobility.  Patient's girlfriend has been caring for him but works during the day and cannot provide meals during that time.     Past Medical History:  He has a past medical history of Anemia, unspecified (10/29/2021), Chronic kidney disease, unspecified (05/11/2022), Epistaxis, Essential (primary) hypertension (08/11/2022), Nontraumatic intracerebral hemorrhage, unspecified (Multi) (12/02/2016), Other forms of dyspnea (02/18/2016), Personal history of other diseases of the circulatory system, Personal history of other endocrine, nutritional and metabolic disease (11/18/2016), and Personal history of transient ischemic attack (TIA), and cerebral infarction without residual deficits.    Past Surgical History:  He has a past surgical history that includes Appendectomy (12/02/2016); MR angio head wo IV contrast (11/19/2020); MR angio neck wo IV contrast (11/19/2020); MR angio head wo IV contrast (2/23/2021); and MR angio neck wo IV contrast (2/23/2021).    Social History:  He reports that he has never smoked. He has never been exposed to tobacco smoke. He has never used smokeless tobacco. He reports that he does not drink alcohol and does not use drugs.    Family History:  No family history on file.    Allergies:  Ace inhibitors  DIABETES MELLITUS TYPE 2:    Diagnosed with diabetes:  April of 2024. Known diabetic complications: stroke.  Does patient follow with Endocrinology: No    Most recent visit in Podiatry: none-- patient denies sores or cuts on feet today   Does pt have proteinuria? Not at this time    Current diabetic medications include:  Basaglar 100 unit/ml inject 10 units at bedtime    Clarifications to above  regimen: none  Adverse Effects: none    Past diabetic medications include:      Glucose Readings:  Glucometer/CGM Type: not sure  Patient tests BG once per day in the evening    Current home BG readings: 150-190, occasionally      Any episodes of hypoglycemia? No,   .   Does the patient have a prescription for ready-to-use Glucagon? No      Lifestyle:  Diet: not the greatest, sandwich or oatmeal during the day until girlfriend gets homes, does not like vegetables, eats apple sauce with medicine, sometimes eats peaches  Physical Activity: Does a few laps in driveway when girlfriend gets home.    Secondary Prevention:  Statin? Not at this time  ACE-I/ARB? No  Aspirin? Not at this time    Pertinent PMH Review:  PMH of Pancreatitis: No  PMH of Retinopathy: No  PMH of Urinary Tract Infections: No  PMH of MTC: No    Drug Interactions:      Objective     Last Recorded Vitals:  BP Readings from Last 6 Encounters:   08/22/24 144/87   07/23/24 158/84   05/30/24 106/88   05/30/24 121/74   01/31/24 (!) 188/113   09/27/23 (!) 165/93        Wt Readings from Last 6 Encounters:   08/22/24 127 kg (279 lb)   07/23/24 126 kg (278 lb)   05/30/24 126 kg (278 lb)   01/31/24 141 kg (310 lb)   09/27/23 139 kg (307 lb)   09/01/23 139 kg (307 lb)       BMI Readings from Last 6 Encounters:   08/22/24 42.42 kg/m²   07/23/24 42.27 kg/m²   05/30/24 42.27 kg/m²   01/31/24 47.14 kg/m²   09/27/23 46.68 kg/m²   09/01/23 46.68 kg/m²       Lab Review  Lab Results   Component Value Date    BILITOT 0.5 08/15/2023    CALCIUM 9.5 10/11/2024    CO2 26 10/11/2024     10/11/2024    CREATININE 1.85 (H) 10/11/2024    GLUCOSE 117 (H) 10/11/2024    ALKPHOS 54 08/15/2023    K 3.6 10/11/2024    PROT 7.5 08/15/2023     10/11/2024    AST 16 08/15/2023    ALT 16 08/15/2023    BUN 13 10/11/2024    ANIONGAP 15 10/11/2024    MG 2.00 02/23/2021    PHOS 2.3 (L) 09/28/2023    ALBUMIN 4.0 08/15/2023    GFRMALE 40 (A) 09/28/2023     Lab Results   Component Value  "Date    TRIG 130 10/29/2021    CHOL 132 08/15/2023    HDL 32.3 (A) 08/15/2023     Lab Results   Component Value Date    HGBA1C 6.5 (H) 10/11/2024    HGBA1C 7.2 (H) 04/11/2024    HGBA1C 6.5 (A) 08/15/2023     No components found for: \"UACR\"  The 10-year ASCVD risk score (Eric PATTEN, et al., 2019) is: 22.5%    Values used to calculate the score:      Age: 53 years      Sex: Male      Is Non- : Yes      Diabetic: Yes      Tobacco smoker: No      Systolic Blood Pressure: 144 mmHg      Is BP treated: Yes      HDL Cholesterol: 32.3 mg/dL      Total Cholesterol: 132 mg/dL      Assessment/Plan   Problem List Items Addressed This Visit    None      Patients diabetes is well controlled with most recent A1c of 6.5% (Goal < 7%).   Initiate: Ozempic 0.25 mg weekly pending prior authorization  Continue: Basaglar 10 units at bedtime  Education Provided to Patient:   GLP-1 Education  GLP-1 medications work by stimulating insulin release from the pancreas, slowing gastric emptying, inhibiting post meal glucagon release, and reducing appetite.   Discussed benefits of GLP-1 including lowering blood sugar, blood pressure, improving lipid profile, improving fatty liver disease, reducing the risk of heart disease and kidney disease, weight loss, and delaying the progression of diabetes-related nephropathy.   Side effects could include but are not limited to low blood sugar (hypoglycemia), loss of appetite, nausea, vomiting, diarrhea, and delayed gastric emptying.   Encouraged smaller meals and avoiding high fat meals to minimize nausea.   Discussed treating hypoglycemia with 15 grams of carbohydrates (1/2 glass of juice, piece of fruit, 3-4 glucose tablets)  Rare but serious side effects could include but are not limited to gallbladder disease, pancreatitis, medullary thyroid cancer, acute kidney injury, worsening diabetes related retinopathy, gastroparesis, and bowel obstruction.   Follow-up: 10/28/2024 11 " AM.  PCP Follow-Up:        Thank you,   Rogelio Melendez, PharmD  Resident Pharmacy Specialist, Primary Care   [phone]     Continue all meds under the continuation of care with the referring provider and clinical pharmacy team.

## 2024-10-21 ENCOUNTER — APPOINTMENT (OUTPATIENT)
Dept: PHARMACY | Facility: HOSPITAL | Age: 54
End: 2024-10-21
Payer: COMMERCIAL

## 2024-10-21 DIAGNOSIS — E11.9 CONTROLLED TYPE 2 DIABETES MELLITUS WITHOUT COMPLICATION, WITH LONG-TERM CURRENT USE OF INSULIN (MULTI): ICD-10-CM

## 2024-10-21 DIAGNOSIS — Z79.4 CONTROLLED TYPE 2 DIABETES MELLITUS WITHOUT COMPLICATION, WITH LONG-TERM CURRENT USE OF INSULIN (MULTI): Primary | ICD-10-CM

## 2024-10-21 DIAGNOSIS — E11.9 CONTROLLED TYPE 2 DIABETES MELLITUS WITHOUT COMPLICATION, WITH LONG-TERM CURRENT USE OF INSULIN (MULTI): Primary | ICD-10-CM

## 2024-10-21 DIAGNOSIS — Z79.4 CONTROLLED TYPE 2 DIABETES MELLITUS WITHOUT COMPLICATION, WITH LONG-TERM CURRENT USE OF INSULIN (MULTI): ICD-10-CM

## 2024-10-21 NOTE — PROGRESS NOTES
I reviewed the progress note and agree with the resident’s findings and plans as written. Case discussed with resident.    Thank you,   Adeline Rasheed, PharmD  Clinical Pharmacy Specialist, Primary Care   [phone]

## 2024-10-23 ENCOUNTER — APPOINTMENT (OUTPATIENT)
Dept: PRIMARY CARE | Facility: CLINIC | Age: 54
End: 2024-10-23
Payer: COMMERCIAL

## 2024-10-23 DIAGNOSIS — M1A.9XX0 CHRONIC GOUT WITHOUT TOPHUS, UNSPECIFIED CAUSE, UNSPECIFIED SITE: ICD-10-CM

## 2024-10-23 DIAGNOSIS — I10 UNCONTROLLED HYPERTENSION: ICD-10-CM

## 2024-10-23 DIAGNOSIS — K59.03 DRUG-INDUCED CONSTIPATION: ICD-10-CM

## 2024-10-23 DIAGNOSIS — I61.9 NONTRAUMATIC INTRACEREBRAL HEMORRHAGE, UNSPECIFIED CEREBRAL LOCATION, UNSPECIFIED LATERALITY (MULTI): Primary | ICD-10-CM

## 2024-10-23 DIAGNOSIS — Z79.4 CONTROLLED TYPE 2 DIABETES MELLITUS WITHOUT COMPLICATION, WITH LONG-TERM CURRENT USE OF INSULIN (MULTI): ICD-10-CM

## 2024-10-23 DIAGNOSIS — J40 BRONCHITIS: ICD-10-CM

## 2024-10-23 DIAGNOSIS — E11.9 CONTROLLED TYPE 2 DIABETES MELLITUS WITHOUT COMPLICATION, WITH LONG-TERM CURRENT USE OF INSULIN (MULTI): ICD-10-CM

## 2024-10-23 PROCEDURE — 3044F HG A1C LEVEL LT 7.0%: CPT | Performed by: INTERNAL MEDICINE

## 2024-10-23 PROCEDURE — 99214 OFFICE O/P EST MOD 30 MIN: CPT | Performed by: INTERNAL MEDICINE

## 2024-10-23 RX ORDER — HYDRALAZINE HYDROCHLORIDE 100 MG/1
100 TABLET, FILM COATED ORAL 3 TIMES DAILY
Qty: 270 TABLET | Refills: 1 | Status: SHIPPED | OUTPATIENT
Start: 2024-10-23 | End: 2025-04-21

## 2024-10-23 RX ORDER — ALBUTEROL SULFATE 90 UG/1
2 INHALANT RESPIRATORY (INHALATION) EVERY 6 HOURS PRN
Qty: 54 G | Refills: 1 | Status: SHIPPED | OUTPATIENT
Start: 2024-10-23

## 2024-10-23 RX ORDER — CLONIDINE HYDROCHLORIDE 0.1 MG/1
0.1 TABLET ORAL EVERY 8 HOURS
Qty: 270 TABLET | Refills: 1 | Status: SHIPPED | OUTPATIENT
Start: 2024-10-23

## 2024-10-23 RX ORDER — AMLODIPINE BESYLATE 10 MG/1
10 TABLET ORAL DAILY
Qty: 90 TABLET | Refills: 1 | Status: SHIPPED | OUTPATIENT
Start: 2024-10-23

## 2024-10-23 RX ORDER — CARVEDILOL 25 MG/1
25 TABLET ORAL 2 TIMES DAILY
Qty: 180 TABLET | Refills: 1 | Status: SHIPPED | OUTPATIENT
Start: 2024-10-23

## 2024-10-23 RX ORDER — ALBUTEROL SULFATE 0.83 MG/ML
2.5 SOLUTION RESPIRATORY (INHALATION) 4 TIMES DAILY PRN
Qty: 50 ML | Refills: 0 | Status: SHIPPED | OUTPATIENT
Start: 2024-10-23 | End: 2025-10-23

## 2024-10-23 RX ORDER — AMOXICILLIN 250 MG
2 CAPSULE ORAL DAILY
Qty: 90 TABLET | Refills: 0 | Status: SHIPPED | OUTPATIENT
Start: 2024-10-23

## 2024-10-23 RX ORDER — INSULIN GLARGINE 100 [IU]/ML
10 INJECTION, SOLUTION SUBCUTANEOUS NIGHTLY
Qty: 9 ML | Refills: 1 | Status: SHIPPED | OUTPATIENT
Start: 2024-10-23

## 2024-10-23 NOTE — LETTER
October 23, 2024     Patient: Alvin Dyer   YOB: 1970   Date of Visit: 10/23/2024       To Whom It May Concern:    Alvin Dyer was seen in my clinic on 10/23/2024. Please excuse Alvin for his absence from work until November 25, 2024 due to patient having a stroke and he is incapacitated. The patient is unable to communicate effectively due to dysphasia from his stroke.     If you have any questions or concerns, please don't hesitate to call.         Sincerely,     Tammy Villatoro, DO

## 2024-10-23 NOTE — PROGRESS NOTES
Subjective   Patient ID: Alvin Dyer is a 53 y.o. male who presents via virtual visit for Follow-up.  An interactive audio and video telecommunication system which permits real time communications between the patient (at the originating site) and provider (at the distant site) was utilized to provide this telehealth service.    Verbal consent was requested and obtained from the patient on the day of encounter.  HPI  Pt presents to follow up after stroke. He is doing well but still incapacitated and unable to work. He is drooling and unable to communicate effectively.   He is in need of refills.   Review of Systems   All other systems reviewed and are negative.      Objective   Physical Exam  Constitutional:       General: He is awake.   Psychiatric:         Mood and Affect: Mood normal.         Behavior: Behavior normal.         Assessment/Plan     Problem List Items Addressed This Visit       Uncontrolled hypertension    Relevant Medications    amLODIPine (Norvasc) 10 mg tablet    carvedilol (Coreg) 25 mg tablet    cloNIDine (Catapres) 0.1 mg tablet    hydrALAZINE (Apresoline) 100 mg tablet    Diabetes mellitus type 2, controlled (Multi)    Relevant Medications    insulin glargine (Basaglar KwikPen U-100 Insulin) 100 unit/mL (3 mL) pen    semaglutide 0.25 mg or 0.5 mg (2 mg/3 mL) pen injector    Intracerebral hemorrhage (Multi) - Primary     Incapacitated at this time  Unable to work  Difficult to understand         Chronic gout without tophus    Drug-induced constipation    Relevant Medications    sennosides-docusate sodium (Stool Softener-Laxative) 8.6-50 mg tablet     Other Visit Diagnoses       Bronchitis        Relevant Medications    albuterol 2.5 mg /3 mL (0.083 %) nebulizer solution    albuterol 90 mcg/actuation inhaler

## 2024-10-28 ENCOUNTER — APPOINTMENT (OUTPATIENT)
Dept: PHARMACY | Facility: HOSPITAL | Age: 54
End: 2024-10-28
Payer: COMMERCIAL

## 2024-10-28 DIAGNOSIS — E11.9 CONTROLLED TYPE 2 DIABETES MELLITUS WITHOUT COMPLICATION, WITH LONG-TERM CURRENT USE OF INSULIN (MULTI): ICD-10-CM

## 2024-10-28 DIAGNOSIS — Z79.4 CONTROLLED TYPE 2 DIABETES MELLITUS WITHOUT COMPLICATION, WITH LONG-TERM CURRENT USE OF INSULIN (MULTI): ICD-10-CM

## 2024-11-25 ENCOUNTER — APPOINTMENT (OUTPATIENT)
Dept: PHARMACY | Facility: HOSPITAL | Age: 54
End: 2024-11-25
Payer: COMMERCIAL

## 2024-11-25 DIAGNOSIS — E11.9 CONTROLLED TYPE 2 DIABETES MELLITUS WITHOUT COMPLICATION, WITH LONG-TERM CURRENT USE OF INSULIN (MULTI): ICD-10-CM

## 2024-11-25 DIAGNOSIS — Z79.4 CONTROLLED TYPE 2 DIABETES MELLITUS WITHOUT COMPLICATION, WITH LONG-TERM CURRENT USE OF INSULIN (MULTI): ICD-10-CM

## 2024-11-25 RX ORDER — BLOOD-GLUCOSE SENSOR
EACH MISCELLANEOUS
Qty: 1 EACH | Refills: 0 | Status: CANCELLED | OUTPATIENT
Start: 2024-11-25

## 2024-11-25 NOTE — PROGRESS NOTES
Patient is sent at the request of Tammy Villatoro DO for my opinion regarding Type 2 diabetes.  My final recommendations will be communicated back to the requesting provider by way of shared medical record.    Subjective     HPI: Patient had a stroke in April causing slurred speech and limiting mobility.  Patient's girlfriend has been caring for him but works during the day and cannot provide meals during that time.     Past Medical History:  He has a past medical history of Anemia, unspecified (10/29/2021), Chronic kidney disease, unspecified (05/11/2022), Epistaxis, Essential (primary) hypertension (08/11/2022), Nontraumatic intracerebral hemorrhage, unspecified (Multi) (12/02/2016), Other forms of dyspnea (02/18/2016), Personal history of other diseases of the circulatory system, Personal history of other endocrine, nutritional and metabolic disease (11/18/2016), and Personal history of transient ischemic attack (TIA), and cerebral infarction without residual deficits.    Past Surgical History:  He has a past surgical history that includes Appendectomy (12/02/2016); MR angio head wo IV contrast (11/19/2020); MR angio neck wo IV contrast (11/19/2020); MR angio head wo IV contrast (2/23/2021); and MR angio neck wo IV contrast (2/23/2021).    Social History:  He reports that he has never smoked. He has never been exposed to tobacco smoke. He has never used smokeless tobacco. He reports that he does not drink alcohol and does not use drugs.    Family History:  No family history on file.    Allergies:  Ace inhibitors  DIABETES MELLITUS TYPE 2:    Diagnosed with diabetes:  April of 2024. Known diabetic complications: stroke.  Does patient follow with Endocrinology: No    Most recent visit in Podiatry: none-- patient denies sores or cuts on feet today   Does pt have proteinuria? Not at this time    Current diabetic medications include:  Basaglar 100 unit/ml inject 10 units at bedtime    Clarifications to above  regimen: none  Adverse Effects: none    Past diabetic medications include:  N/A    Glucose Readings:  Glucometer/CGM Type: not sure  Patient tests BG once per day in the evening    Current home BG readings: 150-190, occasionally      Any episodes of hypoglycemia? No,   .   Does the patient have a prescription for ready-to-use Glucagon? No      Lifestyle:  Diet: not the greatest, sandwich or oatmeal during the day until girlfriend gets homes, does not like vegetables, eats apple sauce with medicine, sometimes eats peaches  Physical Activity: Does a few laps in driveway when girlfriend gets home.    Secondary Prevention:  Statin? Not at this time  ACE-I/ARB? No  Aspirin? Not at this time    Pertinent PMH Review:  PMH of Pancreatitis: No  PMH of Retinopathy: No  PMH of Urinary Tract Infections: No  PMH of MTC: No    Drug Interactions:      Objective     Last Recorded Vitals:  BP Readings from Last 6 Encounters:   08/22/24 144/87   07/23/24 158/84   05/30/24 106/88   05/30/24 121/74   01/31/24 (!) 188/113   09/27/23 (!) 165/93        Wt Readings from Last 6 Encounters:   08/22/24 127 kg (279 lb)   07/23/24 126 kg (278 lb)   05/30/24 126 kg (278 lb)   01/31/24 141 kg (310 lb)   09/27/23 139 kg (307 lb)   09/01/23 139 kg (307 lb)       BMI Readings from Last 6 Encounters:   08/22/24 42.42 kg/m²   07/23/24 42.27 kg/m²   05/30/24 42.27 kg/m²   01/31/24 47.14 kg/m²   09/27/23 46.68 kg/m²   09/01/23 46.68 kg/m²       Lab Review  Lab Results   Component Value Date    BILITOT 0.5 08/15/2023    CALCIUM 9.5 10/11/2024    CO2 26 10/11/2024     10/11/2024    CREATININE 1.85 (H) 10/11/2024    GLUCOSE 117 (H) 10/11/2024    ALKPHOS 54 08/15/2023    K 3.6 10/11/2024    PROT 7.5 08/15/2023     10/11/2024    AST 16 08/15/2023    ALT 16 08/15/2023    BUN 13 10/11/2024    ANIONGAP 15 10/11/2024    MG 2.00 02/23/2021    PHOS 2.3 (L) 09/28/2023    ALBUMIN 4.0 08/15/2023    GFRMALE 40 (A) 09/28/2023     Lab Results   Component  "Value Date    TRIG 130 10/29/2021    CHOL 132 08/15/2023    HDL 32.3 (A) 08/15/2023     Lab Results   Component Value Date    HGBA1C 6.5 (H) 10/11/2024    HGBA1C 7.2 (H) 04/11/2024    HGBA1C 6.5 (A) 08/15/2023     No components found for: \"UACR\"  The 10-year ASCVD risk score (Eric DK, et al., 2019) is: 23.5%    Values used to calculate the score:      Age: 54 years      Sex: Male      Is Non- : Yes      Diabetic: Yes      Tobacco smoker: No      Systolic Blood Pressure: 144 mmHg      Is BP treated: Yes      HDL Cholesterol: 32.3 mg/dL      Total Cholesterol: 132 mg/dL      Review of last APPNT:  Patients diabetes is well controlled with most recent A1c of 6.5%.  Initiated Ozempic 0.25 mg weekly with plans to titrate.   Continue Basaglar 10 units at bedtime  Patient reports to signs and symptoms related to hypoglycemia.    Assessment/Plan   Problem List Items Addressed This Visit       Diabetes mellitus type 2, controlled (Multi)    Relevant Orders    Referral to Clinical Pharmacy       Patients diabetes is well controlled with most recent A1c of 6.5% (Goal < 7%).   Patient reports not having much of an appetite, but eating 1-2 meals daily with some snacks  Patient reports no side effects related to Ozempic  Patient reported testing blood sugar levels once daily  Patient reports a couple days of blood sugar levels in the 70s prior to dinner, did not take evening insulin dose those nights  Patient reports no other episodes of hypoglycemia  Increase Ozempic from 0.25 mg weekly to 0.5 mg weekly  Education  Discussed the benefits of using CGM to test blood sugars  Patient agreeable to begin monitoring using CGM    Follow-up: 11/25/2024 11 AM.  PCP Follow-Up: 1/22/25    Thank you,   Rogelio Melendez, PharmD  Resident Pharmacy Specialist, Primary Care   [phone]     Continue all meds under the continuation of care with the referring provider and clinical pharmacy team.     "

## 2024-11-27 RX ORDER — BLOOD-GLUCOSE,RECEIVER,CONT
EACH MISCELLANEOUS
Qty: 1 EACH | Refills: 0 | Status: SHIPPED | OUTPATIENT
Start: 2024-11-27

## 2024-11-27 RX ORDER — HYDROCHLOROTHIAZIDE 12.5 MG/1
CAPSULE ORAL
Qty: 2 EACH | Refills: 3 | Status: SHIPPED | OUTPATIENT
Start: 2024-11-27

## 2025-01-02 ENCOUNTER — APPOINTMENT (OUTPATIENT)
Dept: PHARMACY | Facility: HOSPITAL | Age: 55
End: 2025-01-02
Payer: COMMERCIAL

## 2025-01-22 ENCOUNTER — APPOINTMENT (OUTPATIENT)
Dept: PRIMARY CARE | Facility: CLINIC | Age: 55
End: 2025-01-22
Payer: COMMERCIAL

## 2025-01-22 DIAGNOSIS — K59.03 DRUG-INDUCED CONSTIPATION: ICD-10-CM

## 2025-01-22 DIAGNOSIS — Z79.4 CONTROLLED TYPE 2 DIABETES MELLITUS WITHOUT COMPLICATION, WITH LONG-TERM CURRENT USE OF INSULIN (MULTI): ICD-10-CM

## 2025-01-22 DIAGNOSIS — Z11.59 NEED FOR HEPATITIS C SCREENING TEST: Primary | ICD-10-CM

## 2025-01-22 DIAGNOSIS — K11.7 HYPERSALIVATION: ICD-10-CM

## 2025-01-22 DIAGNOSIS — Z86.73 HISTORY OF STROKE: ICD-10-CM

## 2025-01-22 DIAGNOSIS — E55.9 VITAMIN D DEFICIENCY: ICD-10-CM

## 2025-01-22 DIAGNOSIS — E11.9 CONTROLLED TYPE 2 DIABETES MELLITUS WITHOUT COMPLICATION, WITH LONG-TERM CURRENT USE OF INSULIN (MULTI): ICD-10-CM

## 2025-01-22 DIAGNOSIS — Z11.4 ENCOUNTER FOR SCREENING FOR HIV: ICD-10-CM

## 2025-01-22 DIAGNOSIS — I10 UNCONTROLLED HYPERTENSION: ICD-10-CM

## 2025-01-22 PROCEDURE — 99214 OFFICE O/P EST MOD 30 MIN: CPT | Performed by: INTERNAL MEDICINE

## 2025-01-22 RX ORDER — INSULIN GLARGINE 100 [IU]/ML
10 INJECTION, SOLUTION SUBCUTANEOUS NIGHTLY
Qty: 9 ML | Refills: 1 | Status: SHIPPED | OUTPATIENT
Start: 2025-01-22

## 2025-01-22 RX ORDER — HYDRALAZINE HYDROCHLORIDE 100 MG/1
100 TABLET, FILM COATED ORAL 3 TIMES DAILY
Qty: 270 TABLET | Refills: 1 | Status: SHIPPED | OUTPATIENT
Start: 2025-01-22 | End: 2025-07-21

## 2025-01-22 RX ORDER — AMLODIPINE BESYLATE 10 MG/1
10 TABLET ORAL DAILY
Qty: 90 TABLET | Refills: 1 | Status: SHIPPED | OUTPATIENT
Start: 2025-01-22

## 2025-01-22 RX ORDER — CLONIDINE HYDROCHLORIDE 0.1 MG/1
0.1 TABLET ORAL EVERY 8 HOURS
Qty: 270 TABLET | Refills: 1 | Status: SHIPPED | OUTPATIENT
Start: 2025-01-22

## 2025-01-22 RX ORDER — BLOOD-GLUCOSE SENSOR
EACH MISCELLANEOUS
Qty: 6 EACH | Refills: 1 | Status: SHIPPED | OUTPATIENT
Start: 2025-01-22 | End: 2025-01-24 | Stop reason: SDUPTHER

## 2025-01-22 RX ORDER — AMOXICILLIN 250 MG
2 CAPSULE ORAL DAILY
Qty: 90 TABLET | Refills: 1 | Status: SHIPPED | OUTPATIENT
Start: 2025-01-22

## 2025-01-22 RX ORDER — GLYCOPYRROLATE 1 MG/1
1 TABLET ORAL 2 TIMES DAILY
Qty: 60 TABLET | Refills: 1 | Status: SHIPPED | OUTPATIENT
Start: 2025-01-22 | End: 2025-03-23

## 2025-01-22 RX ORDER — CARVEDILOL 25 MG/1
25 TABLET ORAL 2 TIMES DAILY
Qty: 180 TABLET | Refills: 1 | Status: SHIPPED | OUTPATIENT
Start: 2025-01-22

## 2025-01-22 NOTE — PROGRESS NOTES
Subjective   Patient ID: Alvin Dyer is a 54 y.o. male who presents via virtual visit for Follow-up.  An interactive audio and video telecommunication system which permits real time communications between the patient (at the originating site) and provider (at the distant site) was utilized to provide this telehealth service.    Verbal consent was requested and obtained from the patient on the day of encounter.  HPI  Patient states that he is feeling well.  Patient is taking all medications as prescribed.  Patient states that he is still working on getting disability through the post office and Social Security disability.  Patient states that he continues to have increased saliva production and has difficulty with controlling his saliva and drooling.  This is a side effect from his stroke.  Patient would like to look into a medication that would reduce his saliva production.  Patient states that he is swallowing fine without any difficulty.  Patient states that the weakness is in his mouth and his bottom lip and controlling his drooling.    Review of Systems   All other systems reviewed and are negative.      Objective   Physical Exam  Constitutional:       General: He is awake.      Appearance: Normal appearance. He is well-developed.      Comments: Drooling, slurred speech    Neurological:      Mental Status: He is alert.   Psychiatric:         Mood and Affect: Mood normal.         Behavior: Behavior normal.       Assessment/Plan     Problem List Items Addressed This Visit       Uncontrolled hypertension    Relevant Medications    amLODIPine (Norvasc) 10 mg tablet    carvedilol (Coreg) 25 mg tablet    cloNIDine (Catapres) 0.1 mg tablet    hydrALAZINE (Apresoline) 100 mg tablet    Other Relevant Orders    CBC and Auto Differential    Comprehensive Metabolic Panel    Lipid Panel Non-Fasting    TSH with reflex to Free T4 if abnormal    Cholesterol, LDL Direct    Diabetes mellitus type 2, controlled (Multi)     Relevant Medications    FreeStyle Garcia 3 Sensor device    insulin glargine (Basaglar KwikPen U-100 Insulin) 100 unit/mL (3 mL) pen    semaglutide 0.25 mg or 0.5 mg (2 mg/3 mL) pen injector    Other Relevant Orders    Hemoglobin A1C    History of stroke    Relevant Medications    glycopyrrolate (Robinul) 1 mg tablet    Drug-induced constipation    Relevant Medications    sennosides-docusate sodium (Stool Softener-Laxative) 8.6-50 mg tablet     Other Visit Diagnoses       Need for hepatitis C screening test    -  Primary    Relevant Orders    Hepatitis C Antibody    Encounter for screening for HIV        Relevant Orders    HIV 1/2 Antigen/Antibody Screen with Reflex to Confirmation    Hypersalivation        Relevant Medications    glycopyrrolate (Robinul) 1 mg tablet    Vitamin D deficiency        Relevant Orders    Vitamin D 25-Hydroxy,Total (for eval of Vitamin D levels)        Patient will follow-up in 6-month via virtual